# Patient Record
Sex: FEMALE | Race: WHITE | NOT HISPANIC OR LATINO | Employment: FULL TIME
[De-identification: names, ages, dates, MRNs, and addresses within clinical notes are randomized per-mention and may not be internally consistent; named-entity substitution may affect disease eponyms.]

---

## 2017-08-12 ENCOUNTER — HEALTH MAINTENANCE LETTER (OUTPATIENT)
Age: 57
End: 2017-08-12

## 2017-08-16 ENCOUNTER — OFFICE VISIT (OUTPATIENT)
Dept: FAMILY MEDICINE | Facility: OTHER | Age: 57
End: 2017-08-16
Payer: COMMERCIAL

## 2017-08-16 VITALS
BODY MASS INDEX: 22.62 KG/M2 | DIASTOLIC BLOOD PRESSURE: 70 MMHG | OXYGEN SATURATION: 99 % | SYSTOLIC BLOOD PRESSURE: 116 MMHG | WEIGHT: 122.9 LBS | TEMPERATURE: 98.4 F | HEIGHT: 62 IN | HEART RATE: 78 BPM

## 2017-08-16 DIAGNOSIS — H02.9 EYELID LESION: Primary | ICD-10-CM

## 2017-08-16 PROCEDURE — 99213 OFFICE O/P EST LOW 20 MIN: CPT | Performed by: INTERNAL MEDICINE

## 2017-08-16 ASSESSMENT — PAIN SCALES - GENERAL: PAINLEVEL: NO PAIN (0)

## 2017-08-16 NOTE — MR AVS SNAPSHOT
"              After Visit Summary   2017    Sari Pollock    MRN: [id number]           Patient Information     Date Of Birth          1960        Visit Information        Provider Department      2017 2:20 PM Audi Lin DO [facility]        Today's Diagnoses     Eyelid lesion    -  1       Follow-ups after your visit        Who to contact     If you have questions or need follow up information about today's clinic visit or your schedule please contact [facility] directly at [phone].  Normal or non-critical lab and imaging results will be communicated to you by NeuroPacehart, letter or phone within 4 business days after the clinic has received the results. If you do not hear from us within 7 days, please contact the clinic through NeuroPacehart or phone. If you have a critical or abnormal lab result, we will notify you by phone as soon as possible.  Submit refill requests through Rabixo or call your pharmacy and they will forward the refill request to us. Please allow 3 business days for your refill to be completed.          Additional Information About Your Visit        MyChart Information     Rabixo lets you send messages to your doctor, view your test results, renew your prescriptions, schedule appointments and more. To sign up, go to [email or web address] . Click on \"Log in\" on the left side of the screen, which will take you to the Welcome page. Then click on \"Sign up Now\" on the right side of the page.     You will be asked to enter the access code listed below, as well as some personal information. Please follow the directions to create your username and password.     Your access code is: [email or web address]  Expires: 2017  2:40 PM     Your access code will  in 90 days. If you need help or a new code, please call your [facility] or [phone].        Care EveryWhere ID     This is your Care EveryWhere ID. This could be used by other " "organizations to access your [place] medical records  [id number]        Your Vitals Were     Pulse Temperature Height Last Period Pulse Oximetry BMI (Body Mass Index)    78 98.4  F (36.9  C) (Temporal) 5' 2\" (1.575 m) 10/04/2012 99% 22.48 kg/m2       Blood Pressure from Last 3 Encounters:   08/16/17 116/70   11/06/14 118/60   10/17/12 104/70    Weight from Last 3 Encounters:   08/16/17 122 lb 14.4 oz (55.7 kg)   11/06/14 112 lb (50.8 kg)   10/17/12 115 lb 6.4 oz (52.3 kg)              Today, you had the following     No orders found for display       Primary Care Provider Office Phone # Fax #    Dharmesh Zepeda [phone] [address] [place] [address]        Equal Access to Services     [facility]: Hadii aad ku hadasho Soomaali, waaxda luqadaha, qaybta kaalmada adeegyada, waxay vyin hayjaradn kayleen nielson . So [facility] [phone].    ATENCIÓN: Si habla español, tiene a sotelo disposición servicios gratuitos de asistencia lingüística. Devika al [phone].    We comply with applicable federal civil rights laws and [place] laws. We do not discriminate on the basis of race, color, national origin, age, disability sex, sexual orientation or gender identity.            Thank you!     Thank you for choosing [facility]  for your care. Our goal is always to provide you with excellent care. Hearing back from our patients is one way we can continue to improve our services. Please take a few minutes to complete the written survey that you may receive in the mail after your visit with us. Thank you!             Your Updated Medication List - Protect others around you: Learn how to safely use, store and throw away your medicines at www.disposemymeds.org.          This list is accurate as of: 8/16/17  2:40 PM.  Always use your most recent med list.                   Brand Name Dispense Instructions for use Diagnosis    ALLEGRA 180 MG tablet   Generic drug:  fexofenadine "     30 Tab    1 tab PO qDay PRN    Seasonal allergies

## 2017-08-16 NOTE — PROGRESS NOTES
Chief Complaint   Patient presents with     Lesion     on right eye x 8-9 years     The patient is a pleasant 56-year-old female who has a recurrent skin lesion at the very edge of her upper right lid. She does have this removed before but it has recurred. It is starting to interfere with her vision has extends right in the middle of the pupil. I have explained to the patient that there is absolutely no bleeding in this clinic was going to fix this for her. I've recommended the physicians at [facility] and will set up a referral. She would prefer to contact the physicians in [place] as this is closer and see if anybody there will do plastics. I will be happy to send over referral if she chooses.                          DECISION MAKIN. Eyelid lesion  As above        I have carefully explained the diagnosis and treatment options with the patient. The patient has displayed an understanding of the above, and all subsequent questions were answered.         DO KINGSLEY Olmstead    Portions of this note were produced using [employer]  Although every attempt at real-time proof reading has been made, occasional grammar/syntax errors may have been missed.

## 2017-08-16 NOTE — NURSING NOTE
"Chief Complaint   Patient presents with     Lesion     on right eye x 8-9 years       Initial /70 (BP Location: Left arm, Patient Position: Chair, Cuff Size: Adult Regular)  Pulse 78  Temp 98.4  F (36.9  C) (Temporal)  Ht 5' 2\" (1.575 m)  Wt 122 lb 14.4 oz (55.7 kg)  LMP 10/04/2012  SpO2 99%  BMI 22.48 kg/m2 Estimated body mass index is 22.48 kg/(m^2) as calculated from the following:    Height as of this encounter: 5' 2\" (1.575 m).    Weight as of this encounter: 122 lb 14.4 oz (55.7 kg).  Medication Reconciliation: complete  Health Maintenance reviewed at today's visit patient asked to schedule/complete:   Breast Cancer:  Patient declined  Cervical Cancer:  Patient declined  Colon Cancer:  Patient declined   Michelle WATTS      "

## 2017-11-16 ENCOUNTER — TELEPHONE (OUTPATIENT)
Dept: FAMILY MEDICINE | Facility: OTHER | Age: 57
End: 2017-11-16

## 2017-11-16 NOTE — TELEPHONE ENCOUNTER
Panel Management Review      Patient has the following on her problem list: None      Composite cancer screening  Chart review shows that this patient is due/due soon for the following Pap Smear, Mammogram and Colonoscopy  Summary:    Patient is due/failing the following:   COLONOSCOPY, LDL, MAMMOGRAM and PAP    Action needed:   Patient needs office visit for mammogram, pap, colonoscopy.    Type of outreach:    Sent letter.    Questions for provider review:    None                                                                                                                                    Michelle WATTS       Chart routed to Care Team .

## 2017-11-16 NOTE — LETTER
[place] [address] [address] [facility] [id number] [phone]        Sari PLASCENCIA Maris  [address]  [place] [address]      November 16, 2017      Dear Sari,    I care about your health and have reviewed your health plan, including your medical conditions, medication list, and lab results and am making recommendations based on this review, to better manage your health.    You are in particular need of attention regarding:  -Breast Cancer Screening  -Colon Cancer Screening  -Cervical Cancer Screening    I am recommending that you:  -schedule a MAMMOGRAM which is due. You can call  [phone] to schedule your mammogram.    -schedule a PAP SMEAR EXAM. This is a screening test done during a pelvic exam to check for abnormal changes in the cells of the cervix.  Cervical cancer is preventable and curable if abnormal cells are detected and treated early. You can call your clinic at the number listed above to schedule your Pap Smear.    -schedule a COLONOSCOPY.  Colon cancer is now the second leading cause of cancer-related deaths in the [place] for both men and women.  There are over 130,000 new cases and 50,000 deaths per year from colon cancer.  A recent study, which included patients ages 55 to 79 found 50,400 American deaths from colorectal cancer could have been prevented if patients had undergone a colonoscopy in the previous 10 years.    If you have not had a colonoscopy, we encourage you to schedule by contacting us at [phone], Monday - Friday from 7 am - 5:00 pm.  After hours, you may leave a message and we will return your call during normal business hours.      There is another option called a FIT test, if you don t wish to have a colonoscopy, which needs to be repeated every year.  It does replace the colonoscopy for colorectal cancer screening and can detect hidden bleeding in the lower colon.  If a positive result is obtained, you would be referred for a  colonoscopy. Please discuss this option with your provider.      For patients under/uninsured, we recommend you contact the [employer] program. [employer] is a free colorectal cancer screening program that provides colonoscopies for eligible under/uninsured [place] men and women. If you are interested in receiving a free colonoscopy, please call [employer] at [phone] (mention code [employer]Web) to see if you re eligible.     If you've had the preventative screening completed at another facility or feel you're not due for this screening, please call our clinic at the number listed above or send us a My Chart message so we can update our records. We would like to thank you in advance for taking the time to take care of your health.  If you have any questions, please don t hesitate to contact our clinic.    Sincerely,       Your [facility] Team

## 2018-03-08 ENCOUNTER — TELEPHONE (OUTPATIENT)
Dept: FAMILY MEDICINE | Facility: OTHER | Age: 58
End: 2018-03-08

## 2018-03-08 NOTE — LETTER
[place] [address] [address] [facility] [id number] [phone]        Sari PLASCENCIA Maris  [address]  [place] [address]      March 8, 2018      Dear Sari,    I care about your health and have reviewed your health plan, including your medical conditions, medication list, and lab results and am making recommendations based on this review, to better manage your health.    You are in particular need of attention regarding:  -Breast Cancer Screening  -Colon Cancer Screening  -Cervical Cancer Screening    I am recommending that you:  -schedule a MAMMOGRAM which is due. You can call  [phone] to schedule your mammogram.    -schedule a PAP SMEAR EXAM. This is a screening test done during a pelvic exam to check for abnormal changes in the cells of the cervix.  Cervical cancer is preventable and curable if abnormal cells are detected and treated early. You can call your clinic at the number listed above to schedule your Pap Smear.    -schedule a COLONOSCOPY.  Colon cancer is now the second leading cause of cancer-related deaths in the [place] for both men and women.  There are over 130,000 new cases and 50,000 deaths per year from colon cancer.  A recent study, which included patients ages 55 to 79 found 50,400 American deaths from colorectal cancer could have been prevented if patients had undergone a colonoscopy in the previous 10 years.    If you have not had a colonoscopy, we encourage you to schedule by contacting us at [phone], Monday - Friday from 7 am - 5:00 pm.  After hours, you may leave a message and we will return your call during normal business hours.      There is another option called a FIT test, if you don t wish to have a colonoscopy, which needs to be repeated every year.  It does replace the colonoscopy for colorectal cancer screening and can detect hidden bleeding in the lower colon.  If a positive result is obtained, you would be referred for a  colonoscopy. Please discuss this option with your provider.      For patients under/uninsured, we recommend you contact the [employer] program. [employer] is a free colorectal cancer screening program that provides colonoscopies for eligible under/uninsured [place] men and women. If you are interested in receiving a free colonoscopy, please call [employer] at [phone] (mention code [employer]Web) to see if you re eligible.     If you've had the preventative screening completed at another facility or feel you're not due for this screening, please call our clinic at the number listed above or send us a My Chart message so we can update our records. We would like to thank you in advance for taking the time to take care of your health.  If you have any questions, please don t hesitate to contact our clinic.    Sincerely,       Your [facility] Team

## 2018-03-08 NOTE — TELEPHONE ENCOUNTER
Panel Management Review      Patient has the following on her problem list: None      Composite cancer screening  Chart review shows that this patient is due/due soon for the following Pap Smear, Mammogram and Colonoscopy  Summary:    Patient is due/failing the following:   COLONOSCOPY, MAMMOGRAM and PAP    Action needed:   Patient needs referral/order: mammo, colonoscopy    Type of outreach:    Sent letter.    Questions for provider review:    None                                                                                                                                    Michelle WATTS       Chart routed to Care Team .

## 2018-08-16 ENCOUNTER — TELEPHONE (OUTPATIENT)
Dept: FAMILY MEDICINE | Facility: OTHER | Age: 58
End: 2018-08-16

## 2018-08-16 NOTE — LETTER
[place] [address] [address] [facility] [id number] [phone]        Sari PLASCENCIA Maris  [address]  [place] [address]      August 16, 2018      Dear Sari,    I care about your health and have reviewed your health plan, including your medical conditions, medication list, and lab results and am making recommendations based on this review, to better manage your health.    You are in particular need of attention regarding:  -Cholesterol  -Breast Cancer Screening  -Colon Cancer Screening  -Cervical Cancer Screening    I am recommending that you:  -schedule a FOLLOWUP OFFICE APPOINTMENT with me.  I will recheck your: Lipids (fasting cholesterol - nothing to eat except water and/or meds for 8+ hours) test.  -schedule a MAMMOGRAM which is due. You can call  [phone] to schedule your mammogram.    -schedule a PAP SMEAR EXAM. This is a screening test done during a pelvic exam to check for abnormal changes in the cells of the cervix.  Cervical cancer is preventable and curable if abnormal cells are detected and treated early. You can call your clinic at the number listed above to schedule your Pap Smear.    -schedule a COLONOSCOPY.  Colon cancer is now the second leading cause of cancer-related deaths in the [place] for both men and women.  There are over 130,000 new cases and 50,000 deaths per year from colon cancer.  A recent study, which included patients ages 55 to 79 found 50,400 American deaths from colorectal cancer could have been prevented if patients had undergone a colonoscopy in the previous 10 years.    If you have not had a colonoscopy, we encourage you to schedule by contacting us at [phone], Monday through Friday.  After hours, you may leave a message and we will return your call during normal business hours.      There is another option called a FIT test, if you don t wish to have a colonoscopy, which needs to be repeated every year.  It does replace the  colonoscopy for colorectal cancer screening and can detect hidden bleeding in the lower colon.  If a positive result is obtained, you would be referred for a colonoscopy. Please discuss this option with your provider.      For patients under/uninsured, we recommend you contact the "[employer] ([employer])" program. [employer] is a free colorectal cancer screening program that provides colonoscopies for eligible under/uninsured [place] men and women. If you are interested in receiving a free colonoscopy, please call [employer] at [phone] (mention code [employer]opesWeb) to see if you re eligible.     If you've had the preventative screening completed at another facility or feel you're not due for this screening, please call our clinic at the number listed above or send us a My Chart message so we can update our records. We would like to thank you in advance for taking the time to take care of your health.  If you have any questions, please don t hesitate to contact our clinic.    Sincerely,       Your [facility] Team

## 2018-08-16 NOTE — TELEPHONE ENCOUNTER
Panel Management Review      Patient has the following on her problem list: None      Composite cancer screening  Chart review shows that this patient is due/due soon for the following Pap Smear, Mammogram and Colonoscopy  Summary:    Patient is due/failing the following:   COLONOSCOPY, LDL, MAMMOGRAM and PAP    Action needed:   Patient needs office visit .    Type of outreach:    Sent letter.    Questions for provider review:    None                                                                                                                                    Michelle WATTS       Chart routed to Care Team .

## 2018-08-21 ENCOUNTER — TELEPHONE (OUTPATIENT)
Dept: FAMILY MEDICINE | Facility: CLINIC | Age: 58
End: 2018-08-21

## 2018-08-21 NOTE — TELEPHONE ENCOUNTER
8/21/2018    Attempt 1    Contacted patient in regards to scheduling VIP mammogram  Message on voicemail     Patient is also due for - Preventive Health Screening Colonoscopy, Cervical/PAP and LDL    Comments:       Outreach   CC

## 2018-09-06 NOTE — TELEPHONE ENCOUNTER
9/6/2018    Attempt 2    Contacted patient in regards to scheduling VIP mammogram  Message     Patient is also due for - Preventive Health Screening Colonoscopy, Cervical/PAP and LDL    Comments: no       Outreach   Ave MENDOZA

## 2018-09-12 NOTE — TELEPHONE ENCOUNTER
9/12/2018    Attempt 3    Contacted patient in regards to scheduling VIP mammogram  Message on voicemail     Patient is also due for - Preventive Health Screening Colonoscopy and Cervical/PAP    Comments:       Outreach   Cherise Shaw

## 2023-09-18 ENCOUNTER — NURSE TRIAGE (OUTPATIENT)
Dept: FAMILY MEDICINE | Facility: CLINIC | Age: 63
End: 2023-09-18
Payer: COMMERCIAL

## 2023-09-18 NOTE — TELEPHONE ENCOUNTER
Patient returning call. Appointment has been scheduled with Dr. Zepeda on Friday, 9/22, with a 1:40 pm arrival. Meg Soto LPN

## 2023-09-18 NOTE — TELEPHONE ENCOUNTER
"Patient has had right sided abdominal pain for 3 weeks.    She has a dull ache all the time.  She states at least twice a day she has a sharp pain like she is being stabbed.  It is worse if she is walking or bending, but even laughing can bring it on.    Her pain right now is a 7 out of 10.  She states her bowels have been getting smaller and harder the last few days. She has used a stool softer and this helped. This does not make the pain better.    She states there is a small lump above the area of pain that is soft and \"squishy\"    Per protocol patient advised to be seen today. There are no openings in clinic today. She doesn't want to go to the ER.    Please advise if she can be seen in clinic?    Michelle Martínez RN on 9/18/2023 at 2:20 PM    Reason for Disposition   MILD TO MODERATE constant pain lasting > 2 hours    Additional Information   Negative: Passed out (i.e., fainted, collapsed and was not responding)   Negative: Shock suspected (e.g., cold/pale/clammy skin, too weak to stand, low BP, rapid pulse)   Negative: Sounds like a life-threatening emergency to the triager   Negative: Followed an abdomen (stomach) injury   Negative: Chest pain   Negative: Abdominal pain and pregnant < 20 weeks   Negative: Abdominal pain and pregnant 20 or more weeks   Negative: Pain is mainly in upper abdomen (if needed ask: 'is it mainly above the belly button?')   Negative: Abdomen bloating or swelling are main symptoms   Negative: SEVERE abdominal pain (e.g., excruciating)   Negative: Vomiting red blood or black (coffee ground) material   Negative: Blood in bowel movements  (Exception: Blood on surface of BM with constipation.)   Negative: Black or tarry bowel movements  (Exception: Chronic-unchanged black-grey BMs AND is taking iron pills or Pepto-Bismol.)    Answer Assessment - Initial Assessment Questions  1. LOCATION: \"Where does it hurt?\"       Right lower abdomen- to the right of her belly button.  2. RADIATION: \"Does " "the pain shoot anywhere else?\" (e.g., chest, back)      none  3. ONSET: \"When did the pain begin?\" (e.g., minutes, hours or days ago)       3 weeks ago  4. SUDDEN: \"Gradual or sudden onset?\"      gradual  5. PATTERN \"Does the pain come and go, or is it constant?\"     - If it comes and goes: \"How long does it last?\" \"Do you have pain now?\"      (Note: Comes and goes means the pain is intermittent. It goes away completely between bouts.)     - If constant: \"Is it getting better, staying the same, or getting worse?\"       (Note: Constant means the pain never goes away completely; most serious pain is constant and gets worse.)       Constant, and gets worse when bending  6. SEVERITY: \"How bad is the pain?\"  (e.g., Scale 1-10; mild, moderate, or severe)     - MILD (1-3): Doesn't interfere with normal activities, abdomen soft and not tender to touch.      - MODERATE (4-7): Interferes with normal activities or awakens from sleep, abdomen tender to touch.      - SEVERE (8-10): Excruciating pain, doubled over, unable to do any normal activities.        7 out of 10- getting worse today  7. RECURRENT SYMPTOM: \"Have you ever had this type of stomach pain before?\" If Yes, ask: \"When was the last time?\" and \"What happened that time?\"       She had interception years ago  8. CAUSE: \"What do you think is causing the stomach pain?\"      Not sure  9. RELIEVING/AGGRAVATING FACTORS: \"What makes it better or worse?\" (e.g., antacids, bending or twisting motion, bowel movement)      No relief from bowels  10. OTHER SYMPTOMS: \"Do you have any other symptoms?\" (e.g., back pain, diarrhea, fever, urination pain, vomiting)        none  11. PREGNANCY: \"Is there any chance you are pregnant?\" \"When was your last menstrual period?\"        no    Protocols used: Abdominal Pain - Female-A-[place]    "

## 2023-09-18 NOTE — TELEPHONE ENCOUNTER
Message handled by Nurse Triage with [employer] - provider name: Duane .    Patient can be seen in clinic this week.   There are openings on Friday.    Left message at home number to call back.  Michelle Martínez RN on 9/18/2023 at 2:54 PM

## 2023-09-22 ENCOUNTER — TELEPHONE (OUTPATIENT)
Dept: FAMILY MEDICINE | Facility: CLINIC | Age: 63
End: 2023-09-22

## 2023-09-22 ENCOUNTER — OFFICE VISIT (OUTPATIENT)
Dept: FAMILY MEDICINE | Facility: CLINIC | Age: 63
End: 2023-09-22
Payer: COMMERCIAL

## 2023-09-22 VITALS
OXYGEN SATURATION: 99 % | WEIGHT: 136 LBS | HEART RATE: 66 BPM | SYSTOLIC BLOOD PRESSURE: 122 MMHG | BODY MASS INDEX: 25.03 KG/M2 | TEMPERATURE: 98 F | RESPIRATION RATE: 18 BRPM | DIASTOLIC BLOOD PRESSURE: 76 MMHG | HEIGHT: 62 IN

## 2023-09-22 DIAGNOSIS — Z13.220 LIPID SCREENING: ICD-10-CM

## 2023-09-22 DIAGNOSIS — R10.11 RUQ ABDOMINAL PAIN: Primary | ICD-10-CM

## 2023-09-22 LAB
ALBUMIN SERPL BCG-MCNC: 4.7 G/DL (ref 3.5–5.2)
ALP SERPL-CCNC: 64 U/L (ref 35–104)
ALT SERPL W P-5'-P-CCNC: 21 U/L (ref 0–50)
ANION GAP SERPL CALCULATED.3IONS-SCNC: 14 MMOL/L (ref 7–15)
AST SERPL W P-5'-P-CCNC: 21 U/L (ref 0–45)
BILIRUB SERPL-MCNC: 0.5 MG/DL
BUN SERPL-MCNC: 14.3 MG/DL (ref 8–23)
CALCIUM SERPL-MCNC: 9.9 MG/DL (ref 8.8–10.2)
CHLORIDE SERPL-SCNC: 104 MMOL/L (ref 98–107)
CHOLEST SERPL-MCNC: 206 MG/DL
CREAT SERPL-MCNC: 0.74 MG/DL (ref 0.51–0.95)
DEPRECATED HCO3 PLAS-SCNC: 21 MMOL/L (ref 22–29)
EGFRCR SERPLBLD CKD-EPI 2021: >90 ML/MIN/1.73M2
ERYTHROCYTE [DISTWIDTH] IN BLOOD BY AUTOMATED COUNT: 12.6 % (ref 10–15)
GLUCOSE SERPL-MCNC: 100 MG/DL (ref 70–99)
HCT VFR BLD AUTO: 38.8 % (ref 35–47)
HDLC SERPL-MCNC: 66 MG/DL
HGB BLD-MCNC: 13 G/DL (ref 11.7–15.7)
LDLC SERPL CALC-MCNC: 122 MG/DL
LIPASE SERPL-CCNC: 19 U/L (ref 13–60)
MCH RBC QN AUTO: 31.9 PG (ref 26.5–33)
MCHC RBC AUTO-ENTMCNC: 33.5 G/DL (ref 31.5–36.5)
MCV RBC AUTO: 95 FL (ref 78–100)
NONHDLC SERPL-MCNC: 140 MG/DL
PLATELET # BLD AUTO: 347 10E3/UL (ref 150–450)
POTASSIUM SERPL-SCNC: 4.3 MMOL/L (ref 3.4–5.3)
PROT SERPL-MCNC: 7.5 G/DL (ref 6.4–8.3)
RBC # BLD AUTO: 4.08 10E6/UL (ref 3.8–5.2)
SODIUM SERPL-SCNC: 139 MMOL/L (ref 136–145)
TRIGL SERPL-MCNC: 88 MG/DL
WBC # BLD AUTO: 6.5 10E3/UL (ref 4–11)

## 2023-09-22 PROCEDURE — 99204 OFFICE O/P NEW MOD 45 MIN: CPT | Performed by: FAMILY MEDICINE

## 2023-09-22 PROCEDURE — 85027 COMPLETE CBC AUTOMATED: CPT | Performed by: FAMILY MEDICINE

## 2023-09-22 PROCEDURE — 80053 COMPREHEN METABOLIC PANEL: CPT | Performed by: FAMILY MEDICINE

## 2023-09-22 PROCEDURE — 80061 LIPID PANEL: CPT | Performed by: FAMILY MEDICINE

## 2023-09-22 PROCEDURE — 83690 ASSAY OF LIPASE: CPT | Performed by: FAMILY MEDICINE

## 2023-09-22 PROCEDURE — 36415 COLL VENOUS BLD VENIPUNCTURE: CPT | Performed by: FAMILY MEDICINE

## 2023-09-22 ASSESSMENT — PAIN SCALES - GENERAL: PAINLEVEL: SEVERE PAIN (6)

## 2023-09-22 NOTE — PROGRESS NOTES
Assessment & Plan     ASSESSMENT/ORDERS:    ICD-10-CM    1. RUQ abdominal pain  R10.11 Comprehensive metabolic panel (BMP + Alb, Alk Phos, ALT, AST, Total. Bili, TP)     CBC with platelets     Lipase     US Abdomen Limited      2. Lipid screening  Z13.220 Lipid panel reflex to direct LDL Fasting        PLAN:  1.  Labs and ultrasound to identify cause of right upper quadrant pain.  Will contact patient after results.                  Dharmesh Zepeda MD  [facility]    Mitchell Guo is a 62 year old, presenting for the following health issues:  Abdominal Pain      9/22/2023     1:43 PM   Additional Questions   Roomed by Amanda MILLER       History of Present Illness       Reason for visit:  Right side stabbing pains  Symptom onset:  3-4 weeks ago  Symptoms include:  Right side stabbing pains  Symptom intensity:  Severe  Symptom progression:  Staying the same  Had these symptoms before:  No  What makes it worse:  Bending,reaching,  What makes it better:  Rest    She eats 2-3 servings of fruits and vegetables daily.She consumes 1 sweetened beverage(s) daily.She exercises with enough effort to increase her heart rate 30 to 60 minutes per day.  She exercises with enough effort to increase her heart rate 5 days per week.   She is taking medications regularly.             Pain to the right of the belly button and slightly lower.  Worse with bending, laughing, talking, strethching.  Working makes it worse, as a  at a [employer], 60/week.      Not related to food.  No blood in stool or melena.  No diarrhea or constipation.      Did have some dizziness 4 days ago with some more severe symptoms.  No fevers, chills, nausea, or vomiting.  Normal appetite.  Minimal weight loss over the last week.      No epigastric or left sided pain.  History of left colon intussception with surgery, but self-resolved, 16 years ago.  No other abdominal surgeries, no pregnancies. Post menopausal x7  "years.  No vaginal discharge or urinary symptoms.     Review of Systems         Objective    /76   Pulse 66   Temp 98  F (36.7  C) (Tympanic)   Resp 18   Ht 1.575 m (5' 2\")   Wt 61.7 kg (136 lb)   LMP 10/04/2012   SpO2 99%   BMI 24.87 kg/m    Body mass index is 24.87 kg/m .  Physical Exam  Constitutional:       Appearance: Normal appearance. She is well-developed.   Cardiovascular:      Rate and Rhythm: Normal rate and regular rhythm.      Heart sounds: Normal heart sounds, S1 normal and S2 normal. No murmur heard.  Pulmonary:      Effort: Pulmonary effort is normal. No respiratory distress.      Breath sounds: Normal breath sounds. No wheezing, rhonchi or rales.   Abdominal:      General: Abdomen is flat. Bowel sounds are normal. There is no distension.      Palpations: Abdomen is soft.      Tenderness: There is generalized abdominal tenderness and tenderness in the right upper quadrant. There is no guarding or rebound. Positive signs include Perez's sign. Negative signs include McBurney's sign.      Hernia: There is no hernia in the umbilical area.   Neurological:      Mental Status: She is alert.                            "

## 2023-09-22 NOTE — RESULT ENCOUNTER NOTE
"Will have staff call patient with message regarding recent results:  \"Test results indicate cholesterol is mildly elevated and all other labs normal.  Will await ultrasound.\"    Dharmesh Zepeda MD "

## 2023-09-22 NOTE — TELEPHONE ENCOUNTER
"----- Message from Dharmesh Zepeda MD sent at 9/22/2023  4:34 PM CDT -----  Will have staff call patient with message regarding recent results:  \"Test results indicate cholesterol is mildly elevated and all other labs normal.  Will await ultrasound.\"    Dharmesh Zepeda MD   "

## 2023-09-26 ENCOUNTER — HOSPITAL ENCOUNTER (OUTPATIENT)
Dept: ULTRASOUND IMAGING | Facility: CLINIC | Age: 63
Discharge: HOME | End: 2023-09-26
Attending: FAMILY MEDICINE | Admitting: FAMILY MEDICINE
Payer: COMMERCIAL

## 2023-09-26 DIAGNOSIS — R10.11 RUQ ABDOMINAL PAIN: ICD-10-CM

## 2023-09-26 PROCEDURE — 76705 ECHO EXAM OF ABDOMEN: CPT

## 2023-10-06 ENCOUNTER — TELEPHONE (OUTPATIENT)
Dept: FAMILY MEDICINE | Facility: CLINIC | Age: 63
End: 2023-10-06
Payer: COMMERCIAL

## 2023-10-06 DIAGNOSIS — R10.84 ABDOMINAL PAIN, GENERALIZED: ICD-10-CM

## 2023-10-06 DIAGNOSIS — R10.11 RUQ ABDOMINAL PAIN: Primary | ICD-10-CM

## 2023-10-06 NOTE — TELEPHONE ENCOUNTER
Patient was seen 9/22/23 for RUQ pain.    She states the pain is not any better and she would like to know what the next steps are?    She states the pain still comes and goes- stays about 4-5 when she is resting. She had coffee the other day and her pain got severe to the point she had to sit for 15 minutes.    Her ultrasound was 9/26/23.    Please advise.    Michelle Martínez RN on 10/6/2023 at 1:35 PM

## 2023-10-06 NOTE — RESULT ENCOUNTER NOTE
See recent separate message in chart regarding results management and patient communication of these results.    Dharmesh Zepeda MD

## 2023-10-06 NOTE — TELEPHONE ENCOUNTER
Recommend CT of abdomen/pelvis to look more indepth for possible abdominal pain causes.  Will have staff notify patient and help schedule.    Dharmesh Zepeda MD

## 2023-10-09 NOTE — TELEPHONE ENCOUNTER
Pt notified. MA offered to schedule but she is driving and will call clinic back to schedule.      Amanda Villa MA

## 2023-10-10 ENCOUNTER — HOSPITAL ENCOUNTER (OUTPATIENT)
Dept: CT IMAGING | Facility: CLINIC | Age: 63
Discharge: HOME | End: 2023-10-10
Attending: FAMILY MEDICINE | Admitting: FAMILY MEDICINE
Payer: COMMERCIAL

## 2023-10-10 DIAGNOSIS — R10.11 RUQ ABDOMINAL PAIN: ICD-10-CM

## 2023-10-10 DIAGNOSIS — R10.84 ABDOMINAL PAIN, GENERALIZED: ICD-10-CM

## 2023-10-10 PROCEDURE — 250N000009 HC RX 250: Performed by: FAMILY MEDICINE

## 2023-10-10 PROCEDURE — 250N000011 HC RX IP 250 OP 636: Performed by: FAMILY MEDICINE

## 2023-10-10 PROCEDURE — 74177 CT ABD & PELVIS W/CONTRAST: CPT

## 2023-10-10 RX ORDER — IOPAMIDOL 755 MG/ML
500 INJECTION, SOLUTION INTRAVASCULAR ONCE
Status: COMPLETED | OUTPATIENT
Start: 2023-10-10 | End: 2023-10-10

## 2023-10-10 RX ADMIN — IOPAMIDOL 70 ML: 755 INJECTION, SOLUTION INTRAVENOUS at 13:38

## 2023-10-10 RX ADMIN — SODIUM CHLORIDE 60 ML: 9 INJECTION, SOLUTION INTRAVENOUS at 13:38

## 2023-10-11 ENCOUNTER — TELEPHONE (OUTPATIENT)
Dept: FAMILY MEDICINE | Facility: CLINIC | Age: 63
End: 2023-10-11
Payer: COMMERCIAL

## 2023-10-11 DIAGNOSIS — R10.11 RUQ ABDOMINAL PAIN: Primary | ICD-10-CM

## 2023-10-11 DIAGNOSIS — R10.84 ABDOMINAL PAIN, GENERALIZED: ICD-10-CM

## 2023-10-11 NOTE — TELEPHONE ENCOUNTER
Patient is calling and stated she did not hear back from the provider on the results of her CT scan that was completed yesterday.  She stated she is in intermittent moderate/ severe pain with certain body movements and she needs to go to work.      Advised patient that provider will send her a message regarding her results, that both the patient and provider get the results at the same time and the provider will typically need a day or so to review results and send a message back to patient.  Advised patient to seek urgent/ emergency care for worsening symptoms.  Patient stated understanding.      Patient is requesting a work note from PCP for missing work due to the pain.  She stated she is unable to take OTC pain medication due to her stomach gets upset, and is asking if there is anything she can take for the pain.    Advised patient of Home care remedies per protocol, Josue Dowling guidelines for [facility] triage, Advised to seek urgent/ emergency care for worsening symptoms.  Patient stated understanding.    Will forward to PCP for review.    Saba Winston RN

## 2023-10-11 NOTE — LETTER
October 12, 2023    To Whom it may Concern:    Sari Pollock is a patient of mine and currently is unable to work due to severe abdominal pain and is undergoing evaluation for this.  She will require medical clearance prior to her return to work.    If you have any further questions, please contact me at the number above.    Sincerely,        Dharmesh Zepeda MD

## 2023-10-12 RX ORDER — PANTOPRAZOLE SODIUM 40 MG/1
40 TABLET, DELAYED RELEASE ORAL DAILY
Qty: 30 TABLET | Refills: 1 | Status: SHIPPED | OUTPATIENT
Start: 2023-10-12 | End: 2023-11-07 | Stop reason: SINTOL

## 2023-10-12 NOTE — TELEPHONE ENCOUNTER
Will have her trial pantoprazole and I ordered a EGD to be completed to see if there is a stomach issue that can only be seen with a scope.    Letter sent via [employer] regarding being unable to work.    Will have staff notify patient.    Dharmesh Zepeda MD

## 2023-10-16 ENCOUNTER — TELEPHONE (OUTPATIENT)
Dept: GASTROENTEROLOGY | Facility: CLINIC | Age: 63
End: 2023-10-16
Payer: COMMERCIAL

## 2023-10-16 NOTE — TELEPHONE ENCOUNTER
"Endoscopy Scheduling Screen    Have you had a positive Covid test in the last 14 days?  No    Are you active on MyChart?   Yes    What insurance is in the chart?  Other:  [employer]    Ordering/Referring Provider:     JERICHO GONZALES      (If ordering provider performs procedure, schedule with ordering provider unless otherwise instructed. )    BMI: Estimated body mass index is 24.87 kg/m  as calculated from the following:    Height as of 9/22/23: 1.575 m (5' 2\").    Weight as of 9/22/23: 61.7 kg (136 lb).     Sedation Ordered  moderate sedation.   If patient BMI > 50 do not schedule in [facility].    If patient BMI > 45 do not schedule at [facility].    Are you taking methadone or Suboxone?  No    Are you taking any prescription medications for pain 3 or more times per week?   No    Do you have a history of malignant hyperthermia or adverse reaction to anesthesia?  Yes (Continue with scheduling. Nurse will notify anesthesia at scheduled location for eval.)    (Females) Are you currently pregnant?        Have you been diagnosed or told you have pulmonary hypertension?   No    Do you have an LVAD?  No    Have you been told you have moderate to severe sleep apnea?  No    Have you been told you have COPD, asthma, or any other lung disease?  No    Do you have any heart conditions?  No     Have you ever had an organ transplant?   No    Have you ever had or are you awaiting a heart or lung transplant?   No    Have you had a stroke or transient ischemic attack (TIA aka \"mini stroke\" in the last 6 months?   No    Have you been diagnosed with or been told you have cirrhosis of the liver?   No    Are you currently on dialysis?   No    Do you need assistance transferring?   No    BMI: Estimated body mass index is 24.87 kg/m  as calculated from the following:    Height as of 9/22/23: 1.575 m (5' 2\").    Weight as of 9/22/23: 61.7 kg (136 lb).     Is patients BMI > 40 and scheduling location [facility]?  No    Do you take an injectable medication " for weight loss or diabetes (excluding insulin)?  No    Do you take the medication Naltrexone?  No    Do you take blood thinners?  No       Prep   Are you currently on dialysis or do you have chronic kidney disease?  No    Do you have a diagnosis of diabetes?  No    Do you have a diagnosis of cystic fibrosis (CF)?  No    On a regular basis do you go 3 -5 days between bowel movements?  No    BMI > 40?  No    Preferred Pharmacy:      [facility] [address] [place], [place] - [address]  [address]  [facility] [address]  Phone: [phone] Fax: [phone]    Final Scheduling Details   Colonoscopy prep sent?      Procedure scheduled  Colonoscopy    Surgeon:  THOMPSON     Date of procedure:  10/26     Pre-OP / PAC:   No - Not required for this site.    Location  [facility] - Per order.    Sedation   MAC/Deep Sedation  [facility]      Patient Reminders:   You will receive a call from a Nurse to review instructions and health history.  This assessment must be completed prior to your procedure.  Failure to complete the Nurse assessment may result in the procedure being cancelled.      On the day of your procedure, please designate an adult(s) who can drive you home stay with you for the next 24 hours. The medicines used in the exam will make you sleepy. You will not be able to drive.      You cannot take public transportation, ride share services, or non-medical taxi service without a responsible caregiver.  Medical transport services are allowed with the requirement that a responsible caregiver will receive you at your destination.  We require that drivers and caregivers are confirmed prior to your procedure.

## 2023-10-18 ENCOUNTER — TELEPHONE (OUTPATIENT)
Dept: FAMILY MEDICINE | Facility: CLINIC | Age: 63
End: 2023-10-18
Payer: COMMERCIAL

## 2023-10-19 NOTE — TELEPHONE ENCOUNTER
She does not.  She should get further instructions from the same day procedure team closer to her procedure date.  Will have staff notify patient.     Dharmesh Zepeda MD

## 2023-10-19 NOTE — TELEPHONE ENCOUNTER
If pain is returning please have her continue same dose of Protonix. The scope will be able to see her entire stomach/duodenum to help determine the source of her pain.     Amanda Lyle PA-C  Covering for Dharmesh Zepeda

## 2023-10-19 NOTE — TELEPHONE ENCOUNTER
Patient notified of providers message below. Patient stating she felt her indigestion/abdominal pain had worsened and had stopped taking the Protonix already. Patient stating she has been eating bland, drinking milk and taking tums which seem to help.    Patient is questioning how far they scope down into her stomach?  Wants message sent to her mychart if able to answer. Meg Soto LPN

## 2023-10-22 ENCOUNTER — TELEPHONE (OUTPATIENT)
Dept: FAMILY MEDICINE | Facility: CLINIC | Age: 63
End: 2023-10-22
Payer: COMMERCIAL

## 2023-10-22 NOTE — TELEPHONE ENCOUNTER
Will have staff notify patient that she should have both EGD and colonoscopy done for her abdominal pain evaluation.  They should both already be scheduled on day of procedure.    Dharmesh Zepeda MD

## 2023-10-22 NOTE — TELEPHONE ENCOUNTER
----- Message from Carey Bridges RN sent at 10/19/2023  6:59 PM CDT -----  Regarding: EGD on 10/26  Patient is questioning if upper endoscopy is the appropriate procedure for the pain she is having. States she is having pain in the RLQ and is questioning weather a colonoscopy would be more appropriate? RN stated she would double check with provider. Thank you!    Carey Bridges RN

## 2023-10-25 ENCOUNTER — ANESTHESIA EVENT (OUTPATIENT)
Dept: GASTROENTEROLOGY | Facility: CLINIC | Age: 63
End: 2023-10-25
Payer: COMMERCIAL

## 2023-10-25 ASSESSMENT — LIFESTYLE VARIABLES: TOBACCO_USE: 1

## 2023-10-26 ENCOUNTER — HOSPITAL ENCOUNTER (OUTPATIENT)
Facility: CLINIC | Age: 63
Discharge: HOME | End: 2023-10-26
Attending: SURGERY | Admitting: SURGERY
Payer: COMMERCIAL

## 2023-10-26 ENCOUNTER — ANESTHESIA (OUTPATIENT)
Dept: GASTROENTEROLOGY | Facility: CLINIC | Age: 63
End: 2023-10-26
Payer: COMMERCIAL

## 2023-10-26 VITALS
OXYGEN SATURATION: 99 % | TEMPERATURE: 97.6 F | DIASTOLIC BLOOD PRESSURE: 76 MMHG | RESPIRATION RATE: 16 BRPM | HEART RATE: 71 BPM | SYSTOLIC BLOOD PRESSURE: 145 MMHG

## 2023-10-26 LAB
COLONOSCOPY: NORMAL
UPPER GI ENDOSCOPY: NORMAL

## 2023-10-26 PROCEDURE — 258N000003 HC RX IP 258 OP 636: Performed by: NURSE ANESTHETIST, CERTIFIED REGISTERED

## 2023-10-26 PROCEDURE — 250N000011 HC RX IP 250 OP 636: Mod: JZ | Performed by: SURGERY

## 2023-10-26 PROCEDURE — 43239 EGD BIOPSY SINGLE/MULTIPLE: CPT | Performed by: SURGERY

## 2023-10-26 PROCEDURE — 250N000013 HC RX MED GY IP 250 OP 250 PS 637: Performed by: NURSE ANESTHETIST, CERTIFIED REGISTERED

## 2023-10-26 PROCEDURE — 88305 TISSUE EXAM BY PATHOLOGIST: CPT | Mod: 26 | Performed by: PATHOLOGY

## 2023-10-26 PROCEDURE — 45378 DIAGNOSTIC COLONOSCOPY: CPT | Performed by: SURGERY

## 2023-10-26 PROCEDURE — 250N000011 HC RX IP 250 OP 636: Mod: JZ | Performed by: NURSE ANESTHETIST, CERTIFIED REGISTERED

## 2023-10-26 PROCEDURE — 370N000017 HC ANESTHESIA TECHNICAL FEE, PER MIN: Performed by: SURGERY

## 2023-10-26 PROCEDURE — 250N000009 HC RX 250: Performed by: NURSE ANESTHETIST, CERTIFIED REGISTERED

## 2023-10-26 PROCEDURE — 88342 IMHCHEM/IMCYTCHM 1ST ANTB: CPT | Mod: 26 | Performed by: PATHOLOGY

## 2023-10-26 PROCEDURE — 88312 SPECIAL STAINS GROUP 1: CPT | Mod: 26 | Performed by: PATHOLOGY

## 2023-10-26 PROCEDURE — 88305 TISSUE EXAM BY PATHOLOGIST: CPT | Mod: TC | Performed by: SURGERY

## 2023-10-26 PROCEDURE — 250N000009 HC RX 250: Performed by: SURGERY

## 2023-10-26 RX ORDER — NALOXONE HYDROCHLORIDE 0.4 MG/ML
0.2 INJECTION, SOLUTION INTRAMUSCULAR; INTRAVENOUS; SUBCUTANEOUS
Status: DISCONTINUED | OUTPATIENT
Start: 2023-10-26 | End: 2023-10-26 | Stop reason: HOSPADM

## 2023-10-26 RX ORDER — ONDANSETRON 2 MG/ML
4 INJECTION INTRAMUSCULAR; INTRAVENOUS EVERY 6 HOURS PRN
Status: DISCONTINUED | OUTPATIENT
Start: 2023-10-26 | End: 2023-10-26 | Stop reason: HOSPADM

## 2023-10-26 RX ORDER — FLUMAZENIL 0.1 MG/ML
0.2 INJECTION, SOLUTION INTRAVENOUS
Status: DISCONTINUED | OUTPATIENT
Start: 2023-10-26 | End: 2023-10-26 | Stop reason: HOSPADM

## 2023-10-26 RX ORDER — GLYCOPYRROLATE 0.2 MG/ML
INJECTION, SOLUTION INTRAMUSCULAR; INTRAVENOUS PRN
Status: DISCONTINUED | OUTPATIENT
Start: 2023-10-26 | End: 2023-10-26

## 2023-10-26 RX ORDER — PROPOFOL 10 MG/ML
INJECTION, EMULSION INTRAVENOUS PRN
Status: DISCONTINUED | OUTPATIENT
Start: 2023-10-26 | End: 2023-10-26

## 2023-10-26 RX ORDER — LIDOCAINE HYDROCHLORIDE 20 MG/ML
INJECTION, SOLUTION INFILTRATION; PERINEURAL PRN
Status: DISCONTINUED | OUTPATIENT
Start: 2023-10-26 | End: 2023-10-26

## 2023-10-26 RX ORDER — PROCHLORPERAZINE MALEATE 5 MG
10 TABLET ORAL EVERY 6 HOURS PRN
Status: DISCONTINUED | OUTPATIENT
Start: 2023-10-26 | End: 2023-10-26 | Stop reason: HOSPADM

## 2023-10-26 RX ORDER — SODIUM CHLORIDE, SODIUM LACTATE, POTASSIUM CHLORIDE, CALCIUM CHLORIDE 600; 310; 30; 20 MG/100ML; MG/100ML; MG/100ML; MG/100ML
INJECTION, SOLUTION INTRAVENOUS CONTINUOUS
Status: DISCONTINUED | OUTPATIENT
Start: 2023-10-26 | End: 2023-10-26 | Stop reason: HOSPADM

## 2023-10-26 RX ORDER — NALOXONE HYDROCHLORIDE 0.4 MG/ML
0.4 INJECTION, SOLUTION INTRAMUSCULAR; INTRAVENOUS; SUBCUTANEOUS
Status: DISCONTINUED | OUTPATIENT
Start: 2023-10-26 | End: 2023-10-26 | Stop reason: HOSPADM

## 2023-10-26 RX ORDER — ONDANSETRON 2 MG/ML
4 INJECTION INTRAMUSCULAR; INTRAVENOUS
Status: COMPLETED | OUTPATIENT
Start: 2023-10-26 | End: 2023-10-26

## 2023-10-26 RX ORDER — PROPOFOL 10 MG/ML
INJECTION, EMULSION INTRAVENOUS CONTINUOUS PRN
Status: DISCONTINUED | OUTPATIENT
Start: 2023-10-26 | End: 2023-10-26

## 2023-10-26 RX ORDER — ONDANSETRON 4 MG/1
4 TABLET, ORALLY DISINTEGRATING ORAL EVERY 6 HOURS PRN
Status: DISCONTINUED | OUTPATIENT
Start: 2023-10-26 | End: 2023-10-26 | Stop reason: HOSPADM

## 2023-10-26 RX ORDER — LIDOCAINE 40 MG/G
CREAM TOPICAL
Status: DISCONTINUED | OUTPATIENT
Start: 2023-10-26 | End: 2023-10-26 | Stop reason: HOSPADM

## 2023-10-26 RX ORDER — CITRIC ACID/SODIUM CITRATE 334-500MG
SOLUTION, ORAL ORAL PRN
Status: DISCONTINUED | OUTPATIENT
Start: 2023-10-26 | End: 2023-10-26

## 2023-10-26 RX ORDER — ACETAMINOPHEN 500 MG
500-1000 TABLET ORAL EVERY 6 HOURS PRN
COMMUNITY

## 2023-10-26 RX ADMIN — LIDOCAINE HYDROCHLORIDE 0.5 ML: 10 INJECTION, SOLUTION EPIDURAL; INFILTRATION; INTRACAUDAL; PERINEURAL at 09:34

## 2023-10-26 RX ADMIN — PROPOFOL 150 MCG/KG/MIN: 10 INJECTION, EMULSION INTRAVENOUS at 09:59

## 2023-10-26 RX ADMIN — PROPOFOL 30 MG: 10 INJECTION, EMULSION INTRAVENOUS at 09:51

## 2023-10-26 RX ADMIN — LIDOCAINE HYDROCHLORIDE 70 MG: 20 INJECTION, SOLUTION INFILTRATION; PERINEURAL at 09:49

## 2023-10-26 RX ADMIN — PROPOFOL 100 MG: 10 INJECTION, EMULSION INTRAVENOUS at 09:49

## 2023-10-26 RX ADMIN — FAMOTIDINE 20 MG: 10 INJECTION, SOLUTION INTRAVENOUS at 09:21

## 2023-10-26 RX ADMIN — PROPOFOL 30 MG: 10 INJECTION, EMULSION INTRAVENOUS at 09:53

## 2023-10-26 RX ADMIN — GLYCOPYRROLATE 0.2 MG: 0.2 INJECTION, SOLUTION INTRAMUSCULAR; INTRAVENOUS at 10:10

## 2023-10-26 RX ADMIN — ONDANSETRON 4 MG: 2 INJECTION INTRAMUSCULAR; INTRAVENOUS at 09:35

## 2023-10-26 RX ADMIN — SODIUM CHLORIDE, POTASSIUM CHLORIDE, SODIUM LACTATE AND CALCIUM CHLORIDE: 600; 310; 30; 20 INJECTION, SOLUTION INTRAVENOUS at 09:33

## 2023-10-26 RX ADMIN — SODIUM CITRATE AND CITRIC ACID MONOHYDRATE 30 ML: 500; 334 SOLUTION ORAL at 09:43

## 2023-10-26 RX ADMIN — GLYCOPYRROLATE 0.2 MG: 0.2 INJECTION, SOLUTION INTRAMUSCULAR; INTRAVENOUS at 10:08

## 2023-10-26 ASSESSMENT — ACTIVITIES OF DAILY LIVING (ADL)
ADLS_ACUITY_SCORE: 35
ADLS_ACUITY_SCORE: 33

## 2023-10-26 NOTE — H&P
Patient seen for Endoscopy    HPI:  Patient is a 62 year old female w abdominal pain here for endoscopy. Not taking blood thinning medications. No MI or CVA history. No issues with previous sedation. No recent acute illness.    Review Of Systems    Skin: negative  Ears/Nose/Throat: negative  Respiratory: No shortness of breath, dyspnea on exertion, cough, or hemoptysis  Cardiovascular: negative  Gastrointestinal: negative  Genitourinary: negative  Musculoskeletal: negative  Neurologic: negative  Hematologic/Lymphatic/Immunologic: negative  Endocrine: negative      Past Medical History:   Diagnosis Date    Anxiety     Ulcers        Past Surgical History:   Procedure Laterality Date    COLON SURGERY  2007    intusseception    [facility] ORAL SURGERY PROCEDURE      2 teeth extracted       Family History   Problem Relation Age of Onset    Hypertension Mother     Alcohol/Drug Mother     Alcohol/Drug Father     Depression Mother     Gastrointestinal Disease Mother     Eye Disorder Father         glaucoma    Gynecology Mother         hyst age 34    Obesity Mother        Social History     Socioeconomic History    Marital status: Single     Spouse name: Not on file    Number of children: Not on file    Years of education: Not on file    Highest education level: Not on file   Occupational History    Not on file   Tobacco Use    Smoking status: Former     Types: Cigarettes     Quit date: 10/17/2010     Years since quittin.0    Smokeless tobacco: Never    Tobacco comments:     occasional smoker   Vaping Use    Vaping Use: Never used   Substance and Sexual Activity    Alcohol use: No    Drug use: No    Sexual activity: Never     Partners: Male   Other Topics Concern    Parent/sibling w/ CABG, MI or angioplasty before 65F 55M? No   Social History Narrative    Not on file     Social Determinants of Health     Financial Resource Strain: Low Risk  (2023)    Financial Resource Strain     Within the past 12 months, have you or  your family members you live with been unable to get utilities (heat, electricity) when it was really needed?: No   Food Insecurity: Low Risk  (9/22/2023)    Food Insecurity     Within the past 12 months, did you worry that your food would run out before you got money to buy more?: No     Within the past 12 months, did the food you bought just not last and you didn t have money to get more?: No   Transportation Needs: Low Risk  (9/22/2023)    Transportation Needs     Within the past 12 months, has lack of transportation kept you from medical appointments, getting your medicines, non-medical meetings or appointments, work, or from getting things that you need?: No   Physical Activity: Not on file   Stress: Not on file   Social Connections: Not on file   Interpersonal Safety: Low Risk  (9/22/2023)    Interpersonal Safety     Do you feel physically and emotionally safe where you currently live?: Yes     Within the past 12 months, have you been hit, slapped, kicked or otherwise physically hurt by someone?: No     Within the past 12 months, have you been humiliated or emotionally abused in other ways by your partner or ex-partner?: No   Housing Stability: Low Risk  (9/22/2023)    Housing Stability     Do you have housing? : Yes     Are you worried about losing your housing?: No       No current outpatient medications on file.       Medications and history reviewed    Physical exam:  Vitals: /72   Temp 97.6  F (36.4  C) (Oral)   Resp 16   LMP 10/04/2012   SpO2 98%   BMI= There is no height or weight on file to calculate BMI.    Constitutional: Healthy, alert, non-distressed   Head: Normo-cephalic, atraumatic, no lesions, masses or tenderness   Cardiovascular: RRR, no new murmurs, +S1, +S2 heart sounds, no clicks, rubs or gallops   Respiratory: CTAB, no rales, rhonchi or wheezing, equal chest rise, good respiratory effort   Gastrointestinal: Soft, non-tender, non distended, no rebound rigidity or guarding, no  masses or hernias palpated   : Deferred  Musculoskeletal: Moves all extremities, normal  strength, no deformities noted   Skin: No suspicious lesions or rashes   Psychiatric: Mentation appears normal, affect appropriate   Hematologic/Lymphatic/Immunologic: Normal cervical and supraclavicular lymph nodes   Patient able to get up on table without difficulty.    Labs show:  No results found for this or any previous visit (from the past 24 hour(s)).    Assessment: Endoscopy  Plan: Pt cleared for anesthesia for proposed procedure.    Huber Mendoza DO

## 2023-10-26 NOTE — ANESTHESIA CARE TRANSFER NOTE
Patient: Sari Pollock    Procedure: Procedure(s):  Esophagogastroduodenoscopy with biopsy  Colonoscopy       Diagnosis: Abdominal pain, generalized [R10.84]  RUQ abdominal pain [R10.11]  Diagnosis Additional Information: No value filed.    Anesthesia Type:   MAC     Note:    Oropharynx: oropharynx clear of all foreign objects and spontaneously breathing  Level of Consciousness: drowsy  Oxygen Supplementation: room air    Independent Airway: airway patency satisfactory and stable  Dentition: dentition unchanged  Vital Signs Stable: post-procedure vital signs reviewed and stable  Report to RN Given: handoff report given  Patient transferred to: PACU    Handoff Report: Identifed the Patient, Identified the Reponsible Provider, Reviewed the pertinent medical history, Discussed the surgical course, Reviewed Intra-OP anesthesia mangement and issues during anesthesia, Set expectations for post-procedure period and Allowed opportunity for questions and acknowledgement of understanding      Vitals:  Vitals Value Taken Time   BP     Temp     Pulse     Resp     SpO2 97 % 10/26/23 1026   Vitals shown include unfiled device data.    Electronically Signed By: MARGARET Kapadia CRNA  October 26, 2023  10:27 AM

## 2023-10-26 NOTE — LETTER
Sari Pollock  [email or web address]  [place] [address]  November 1, 2023    Dear Sari,  This letter is to inform you of the results of your pathology report from your endoscopy.  Your pathology report was:  Final Diagnosis   A(1).  Duodenum, biopsy:  -Small intestinal mucosa with active chronic peptic duodenitis.  -Negative for H. Pylori organisms on immunohistochemical stains.  -Blunted villous architecture identified and no prominence in intraepithelial lymphocytes seen.  -Negative for luminal organisms.  -Negative for dysplasia or malignancy.  B(2).  Esophagus, distal, biopsy:  -Acute esophagitis with prominent reactive epithelial changes.  -Special stains for fungal organisms (PAS) is negative.  -Negative for intestinal metaplasia.  -Negative for eosinophilic esophagitis.  -Negative for dysplasia or malignancy.   The above findings are consistent with inflammatory process of your small intestine and esophagus.  No additional treatment or evaluation is necessary at this time based on these findings.      If you have further questions please don t hesitate to call our clinic at [phone].   Sincerely,     Huber Mendoza, DO

## 2023-10-26 NOTE — ANESTHESIA PREPROCEDURE EVALUATION
Anesthesia Pre-Procedure Evaluation    Patient: Sari Pollock   MRN: [id number] : 1960        Procedure : Procedure(s):  Esophagoscopy, gastroscopy, duodenoscopy (EGD), combined  Colonoscopy          Past Medical History:   Diagnosis Date     Anxiety      Ulcers       Past Surgical History:   Procedure Laterality Date     COLON SURGERY      intusseception     [facility] ORAL SURGERY PROCEDURE      2 teeth extracted      Allergies   Allergen Reactions     Aspirin Other (See Comments)     Severe stomach pain     Penicillins      Doesn't recall reaction     Compazine Itching and Rash     Arms and legs, some on abdomen      Social History     Tobacco Use     Smoking status: Former     Types: Cigarettes     Quit date: 10/17/2010     Years since quittin.0     Smokeless tobacco: Never     Tobacco comments:     occasional smoker   Substance Use Topics     Alcohol use: No      Wt Readings from Last 1 Encounters:   23 61.7 kg (136 lb)        Anesthesia Evaluation   Pt has had prior anesthetic. Type: MAC.    No history of anesthetic complications       ROS/MED HX  ENT/Pulmonary:     (+)                tobacco use, Past use,                      Neurologic:  - neg neurologic ROS     Cardiovascular:  - neg cardiovascular ROS     METS/Exercise Tolerance:     Hematologic:  - neg hematologic  ROS     Musculoskeletal:  - neg musculoskeletal ROS     GI/Hepatic:     (+) GERD, Asymptomatic on medication,                  Renal/Genitourinary:  - neg Renal ROS     Endo:  - neg endo ROS     Psychiatric/Substance Use:     (+) psychiatric history anxiety       Infectious Disease:  - neg infectious disease ROS     Malignancy:  - neg malignancy ROS     Other:  - neg other ROS        Physical Exam    Airway  airway exam normal      Mallampati: II   TM distance: > 3 FB   Neck ROM: full   Mouth opening: > 3 cm    Respiratory Devices and Support         Dental       (+) Minor Abnormalities - some fillings, tiny  "chips      Cardiovascular   cardiovascular exam normal       Rhythm and rate: regular and normal     Pulmonary   pulmonary exam normal        breath sounds clear to auscultation       OUTSIDE LABS:  CBC:   Lab Results   Component Value Date    WBC 6.5 09/22/2023    WBC 6.6 11/06/2014    HGB 13.0 09/22/2023    HGB 13.5 11/06/2014    HCT 38.8 09/22/2023    HCT 41.6 11/06/2014     09/22/2023     11/06/2014     BMP:   Lab Results   Component Value Date     09/22/2023     03/05/2009    POTASSIUM 4.3 09/22/2023    POTASSIUM 3.9 03/05/2009    CHLORIDE 104 09/22/2023    CHLORIDE 105 03/05/2009    CO2 21 (L) 09/22/2023    CO2 21 03/05/2009    BUN 14.3 09/22/2023    BUN 13 03/05/2009    CR 0.74 09/22/2023    CR 0.64 03/05/2009     (H) 09/22/2023    GLC 88 03/05/2009     COAGS: No results found for: \"PTT\", \"INR\", \"FIBR\"  POC:   Lab Results   Component Value Date    HCG Negative 03/05/2009     HEPATIC:   Lab Results   Component Value Date    ALBUMIN 4.7 09/22/2023    PROTTOTAL 7.5 09/22/2023    ALT 21 09/22/2023    AST 21 09/22/2023    ALKPHOS 64 09/22/2023    BILITOTAL 0.5 09/22/2023     OTHER:   Lab Results   Component Value Date    REBEKAH 9.9 09/22/2023    LIPASE 19 09/22/2023    TSH 2.24 11/06/2014       Anesthesia Plan    ASA Status:  2    NPO Status:  NPO Appropriate    Anesthesia Type: MAC.     - Reason for MAC: straight local not clinically adequate      Maintenance: TIVA.        Consents    Anesthesia Plan(s) and associated risks, benefits, and realistic alternatives discussed. Questions answered and patient/representative(s) expressed understanding.     - Discussed:     - Discussed with:  Patient       Use of blood products discussed: No .     Postoperative Care            Comments:    Other Comments: The risks and benefits of anesthesia, and the alternatives where applicable, have been discussed with the patient, and they wish to proceed.          Les Arteaga, APRN CRNA  "

## 2023-10-26 NOTE — ANESTHESIA POSTPROCEDURE EVALUATION
Patient: Sari Pollock    Procedure: Procedure(s):  Esophagogastroduodenoscopy with biopsy  Colonoscopy       Anesthesia Type:  MAC    Note:  Disposition: Outpatient   Postop Pain Control: Uneventful            Sign Out: Well controlled pain   PONV: No   Neuro/Psych: Uneventful            Sign Out: Acceptable/Baseline neuro status   Airway/Respiratory: Uneventful            Sign Out: Acceptable/Baseline resp. status   CV/Hemodynamics: Uneventful            Sign Out: Acceptable CV status   Other NRE: NONE   DID A NON-ROUTINE EVENT OCCUR? No    Event details/Postop Comments:  Pt was happy with anesthesia care.  No complications.  I will follow up with the pt if needed.           Last vitals:  Vitals Value Taken Time   /76 10/26/23 1050   Temp     Pulse 71 10/26/23 1050   Resp     SpO2 99 % 10/26/23 1044   Vitals shown include unfiled device data.    Electronically Signed By: MARGARET Kapadia CRNA  October 26, 2023  10:53 AM

## 2023-10-26 NOTE — DISCHARGE INSTRUCTIONS
[facility]    Home Care Following Endoscopy          Activity:  You have just undergone an endoscopic procedure usually performed with conscious sedation.  Do not work or operate machinery (including a car) for at least 12 hours.    I encourage you to walk and attempt to pass this air as soon as possible.    Diet:  Return to the diet you were on before your procedure but eat lightly for the first 12-24 hours.  Drink plenty of water.  Resume any regular medications unless otherwise advised by your physician.  Please begin any new medication prescribed as a result of your procedure as directed by your physician.   If you had any biopsy or polyp removed please refrain from aspirin or aspirin products for 2 days.  If on Coumadin please restart as instructed by your physician.   Pain:  You may take Tylenol as needed for pain.  Expected Recovery:  You can expect some mild abdominal fullness and/or discomfort due to the air used to inflate your intestinal tract. It is also normal to have a mild sore throat after upper endoscopy.    Call Your Physician if You Have:  After Upper Endoscopy:  Shoulder, back or chest pain.  Difficulty breathing or swallowing.  Vomiting blood.  After Colonoscopy:  Worsening persisting abdominal pain which is worse with activity.  Fevers (>101 degrees F), chills or shakes.  Passage of continued blood with bowel movements.     Any questions or concerns about your recovery, please call [phone] or after hours [phone] ([phone]) Nurse Advice Line.    Follow-up Care:  You DID have tissue sample(s) removed.  The  tissue sample(s) will be sent to pathology.    You should receive letter in your My Chart from Dr Mendoza with your results within 1-2 weeks. If you do not participate in My Chart a physical letter will come in the mail in 2-3 weeks.  Please call if you have not received a notification of your results.              Call [phone].

## 2023-10-31 ENCOUNTER — NURSE TRIAGE (OUTPATIENT)
Dept: FAMILY MEDICINE | Facility: CLINIC | Age: 63
End: 2023-10-31
Payer: COMMERCIAL

## 2023-10-31 DIAGNOSIS — K21.00 GASTROESOPHAGEAL REFLUX DISEASE WITH ESOPHAGITIS WITHOUT HEMORRHAGE: Primary | ICD-10-CM

## 2023-10-31 LAB
PATH REPORT.COMMENTS IMP SPEC: NORMAL
PATH REPORT.COMMENTS IMP SPEC: NORMAL
PATH REPORT.FINAL DX SPEC: NORMAL
PATH REPORT.GROSS SPEC: NORMAL
PATH REPORT.MICROSCOPIC SPEC OTHER STN: NORMAL
PATH REPORT.MICROSCOPIC SPEC OTHER STN: NORMAL
PATH REPORT.RELEVANT HX SPEC: NORMAL
PHOTO IMAGE: NORMAL

## 2023-10-31 NOTE — TELEPHONE ENCOUNTER
Nurse Triage SBAR    Is this a 2nd Level Triage? NO    Situation: Patient is continuing to have the RUQ pain. She had an EGD and colonoscopy done on 12/26/23. She is asking for a GI referral or next steps.    Background: Ongoing RUQ pain    Assessment: Patient still experiencing GI pain, Did ask patient if she is trying anything for the discomfort, states she is using a heating pad, and using Tylenol and Ibuprofen. Did ask patient to also await her results from recent scope.    Protocol Recommended Disposition:   See in Office Within 2 Weeks    Recommendation: Patient requesting a GI referral and any further suggestions on next steps.        Routed to provider    Does the patient meet one of the following criteria for ADS visit consideration? No      FERNANDEZ Monten[place], RN        Reason for Disposition   Abdominal pain is a chronic symptom (recurrent or ongoing AND lasting > 4 weeks)    Additional Information   Negative: SEVERE difficulty breathing (e.g., struggling for each breath, speaks in single words)   Negative: Shock suspected (e.g., cold/pale/clammy skin, too weak to stand, low BP, rapid pulse)   Negative: Difficult to awaken or acting confused (e.g., disoriented, slurred speech)   Negative: Passed out (i.e., lost consciousness, collapsed and was not responding)   Negative: Visible sweat on face or sweat is dripping down   Negative: Sounds like a life-threatening emergency to the triager   Negative: Followed an abdomen (stomach) injury   Negative: Chest pain   Negative: Abdominal pain and pregnant < 20 weeks   Negative: Abdominal pain and pregnant 20 or more weeks   Negative: Abdomen bloating or swelling are main symptoms   Negative: SEVERE abdominal pain (e.g., excruciating)   Negative: Pain lasting > 10 minutes and over 50 years old   Negative: Pain lasting > 10 minutes and over 40 years old and associated chest, arm, neck, upper back, or jaw pain   Negative: Pain lasting > 10 minutes and over 35 years old  and at least one cardiac risk factor (e.g., diabetes, high cholesterol, hypertension, obesity, smoker or strong family history of heart disease)   Negative: Pain lasting > 10 minutes and history of heart disease (i.e., heart attack, bypass surgery, angina, angioplasty, CHF)   Negative: Pain lasts > 10 minutes and difficulty breathing   Negative: Vomiting red blood or black (coffee ground) material  (Exception: Few streaks and only occurred once.)   Negative: Blood in bowel movements  (Exception: Blood on surface of BM with constipation.)   Negative: Black or tarry bowel movements  (Exception: Chronic-unchanged black-grey BMs AND is taking iron pills or Pepto-Bismol.)   Negative: Pregnant 20 weeks or more and new hand or face swelling   Negative: Unhealthy alcohol use, known or suspected   Negative: Patient wants to be seen   Negative: MILD TO MODERATE constant pain lasting > 2 hours   Negative: MILD TO MODERATE pain and not relieved by antacid medicine   Negative: Vomiting bile (green color)   Negative: Patient sounds very sick or weak to the triager   Negative: Vomiting and abdomen looks much more swollen than usual   Negative: White of the eyes have turned yellow (i.e., jaundice)   Negative: Fever > 103 F (39.4 C)   Negative: Fever > 101 F (38.3 C) and over 60 years of age   Negative: Fever > 100.0 F (37.8 C) and has diabetes mellitus or a weak immune system (e.g., HIV positive, cancer chemotherapy, organ transplant, splenectomy, chronic steroids)   Negative: Fever > 100.0 F (37.8 C) and bedridden (e.g., CVA, chronic illness, recovering from surgery)   Negative: MODERATE pain that comes and goes (cramps) lasts > 24 hours   Negative: MILD pain that comes and goes (cramps) > 72 hours  (Exception: This same abdominal pain is a chronic symptom recurrent or ongoing AND present > 4 weeks.)    Protocols used: Abdominal Pain - Upper-A-[place]

## 2023-10-31 NOTE — TELEPHONE ENCOUNTER
I recommend discussing her symptoms and recent testing results.  Schedule her a telephone or video-based visit this week or next week so we can discuss this.    Dharmesh Zepeda MD

## 2023-11-01 RX ORDER — SUCRALFATE 1 G/1
1 TABLET ORAL 4 TIMES DAILY
Qty: 40 TABLET | Refills: 0 | Status: SHIPPED | OUTPATIENT
Start: 2023-11-01 | End: 2023-12-14

## 2023-11-01 NOTE — TELEPHONE ENCOUNTER
Called and LM for patient to call back. Please relay below. Okay for TC or MA to use a gray slot next week.     Loren Florian MA

## 2023-11-01 NOTE — TELEPHONE ENCOUNTER
Patient returned call, scheduled telephone visit on 11/7 with Dr. Zepeda. Transferred to triage for patient to discuss pain with RN.

## 2023-11-01 NOTE — TELEPHONE ENCOUNTER
Patient is having pain like she had before her EGD.    She is wondering if there is anything she can do for her pain until she has her appointment.    She states the pain can go from mild to sharp stabbing pain. This pain is similar to what she had before- it has not increased or gotten worse.    The only things that help are drinking water and resting.    She states the pain is not new since surgery.    Please advise if there is anything she can use for the pain.    Michelle Martínez RN on 11/1/2023 at 3:45 PM

## 2023-11-02 NOTE — TELEPHONE ENCOUNTER
Will have her try sucralfate four times daily.  Take 1 hour before meals and at bedtime.  Will need to take Protonix 30 min prior to sucralfate.    Will have staff notify patient.    Dharmesh Zepeda MD

## 2023-11-02 NOTE — TELEPHONE ENCOUNTER
RN TRIAGE CALL:    Patient Contact    Attempt # 1    Was call answered?  No.  Left message on voicemail with information to call any triage nurse at [phone] regarding her conversation with one of our triage nurses yesterday.    Saba Winston RN

## 2023-11-03 NOTE — TELEPHONE ENCOUNTER
RN TRIAGE CALL:    Patient Contact    Attempt # 2    Was call answered?  No.  Left message on voicemail with information to call any one of the triage nurses back regarding his message a few days earlier.    Saba Winston RN

## 2023-11-06 NOTE — TELEPHONE ENCOUNTER
RN TRIAGE CALL:    Patient Contact    Attempt # 3    Was call answered?  No.  Left message on voicemail with information to call any one of the triage nurses back at [phone] regarding a message that was received last week and Dr. Duane BARCLAY's documented recommendation.    My Chart message sent.  Will close encounter at this time.    Saba Winston RN

## 2023-11-07 ENCOUNTER — VIRTUAL VISIT (OUTPATIENT)
Dept: FAMILY MEDICINE | Facility: CLINIC | Age: 63
End: 2023-11-07
Payer: COMMERCIAL

## 2023-11-07 DIAGNOSIS — K29.80 DUODENITIS: Primary | ICD-10-CM

## 2023-11-07 PROCEDURE — 99213 OFFICE O/P EST LOW 20 MIN: CPT | Mod: 93 | Performed by: FAMILY MEDICINE

## 2023-11-07 PROCEDURE — 96127 BRIEF EMOTIONAL/BEHAV ASSMT: CPT | Mod: 93 | Performed by: FAMILY MEDICINE

## 2023-11-07 ASSESSMENT — ANXIETY QUESTIONNAIRES
2. NOT BEING ABLE TO STOP OR CONTROL WORRYING: NEARLY EVERY DAY
1. FEELING NERVOUS, ANXIOUS, OR ON EDGE: SEVERAL DAYS
IF YOU CHECKED OFF ANY PROBLEMS ON THIS QUESTIONNAIRE, HOW DIFFICULT HAVE THESE PROBLEMS MADE IT FOR YOU TO DO YOUR WORK, TAKE CARE OF THINGS AT HOME, OR GET ALONG WITH OTHER PEOPLE: SOMEWHAT DIFFICULT
GAD7 TOTAL SCORE: 11
6. BECOMING EASILY ANNOYED OR IRRITABLE: NEARLY EVERY DAY
5. BEING SO RESTLESS THAT IT IS HARD TO SIT STILL: NOT AT ALL
7. FEELING AFRAID AS IF SOMETHING AWFUL MIGHT HAPPEN: NOT AT ALL
GAD7 TOTAL SCORE: 11
4. TROUBLE RELAXING: SEVERAL DAYS
3. WORRYING TOO MUCH ABOUT DIFFERENT THINGS: NEARLY EVERY DAY

## 2023-11-07 ASSESSMENT — PATIENT HEALTH QUESTIONNAIRE - PHQ9: SUM OF ALL RESPONSES TO PHQ QUESTIONS 1-9: 10

## 2023-11-07 NOTE — RESULT ENCOUNTER NOTE
Results discussed during clinic visit.    Dharmesh Zepeda MD  Additional Notes: Patient consent was obtained to proceed with the visit and recommended plan of care after discussion of all risks and benefits, including the risks of COVID-19 exposure. Detail Level: Simple

## 2023-11-07 NOTE — PROGRESS NOTES
Sari is a 62 year old who is being evaluated via a billable telephone visit.      What phone number would you like to be contacted at? [phone]   How would you like to obtain your AVS? Sarah    [place] Location (provider location):  On-site    Assessment & Plan     ASSESSMENT/ORDERS:    ICD-10-CM    1. Duodenitis  K29.80 omeprazole (PRILOSEC) 20 MG DR capsule        PLAN:  1.  Trial of PPI for 4-6 weeks.  Would try H2 blocker if side effects of ompeparzole.  Continue Carafate for 10 days.  2.  Can do HIDA scan if she wants, but discussed concerns with false positive with no link to food intake.  3.  Can also trial wheat avoidance as she may have possible gluten intolerance/celiac disease, but this is less likely.              Depression Screening Follow Up        11/7/2023    10:44 AM   PHQ   PHQ-9 Total Score 10   Q9: Thoughts of better off dead/self-harm past 2 weeks Not at all         Follow Up Actions Taken           Dharmesh Zepeda MD  [facility]   Sari is a 62 year old, presenting for the following health issues:  Results (Endoscopy and Colonoscpoy) and Pain (Pain management)  - Recheck gallbladder, still feels like she is having issues        11/7/2023    10:39 AM   Additional Questions   Roomed by Bobby CHRISTINA   Accompanied by Self         11/7/2023    10:39 AM   Patient Reported Additional Medications   Patient reports taking the following new medications None       Musculoskeletal Problem    History of Present Illness       Reason for visit:  Pain Follow up        Medication Followup of Protonix  Taking Medication as prescribed: NO-Only took for 5 days. Has been a few weeks  Side Effects:  Abdominal burning  Medication Helping Symptoms:  NO-Made pain in other places but did not help with original pain    Medication Followup of Sucralfate  Taking Medication as prescribed: yes  Side Effects:  Casuing headaches and right sided pain on the side she is usually  having pain in.  Medication Helping Symptoms:  Only has been taking for a few days    Pain History:  When did you first notice your pain? At the end of September   Have you seen this provider for your pain in the past? Yes   Where in your body do you have pain? Right side of the belly button, a little lower and radiates up under the rib cage. About a 4 inch vertical area.   Are you seeing anyone else for your pain? No                3/17/2009    11:00 AM 11/7/2023    10:44 AM   PHQ-9 SCORE   PHQ-9 Total Score 12    PHQ-9 Total Score  10           11/7/2023    10:46 AM   ALEXEY-7 SCORE   Total Score 11           11/7/2023    10:47 AM   PEG Score   PEG Total Score 7       Chronic Pain Follow Up:    PDMP Review       None          Last CSA Agreement:   CSA -- Patient Level:    CSA: None found at the patient level.       Last UDS:           Reviewed EGD results.  Duodenitis.  Not better yet, but had to stop Protonix due to diarrhea.  Is on day 1 of Carafate, not sure if it is helping.    She wonders about gallbladder disease.  Pain not related to food intake.  Has improved with caffeine cessation.     Review of Systems         Objective           Vitals:  No vitals were obtained today due to virtual visit.    Physical Exam   healthy, alert, and no distress  PSYCH: Alert and oriented times 3; coherent speech, normal   rate and volume, able to articulate logical thoughts, able   to abstract reason, no tangential thoughts, no hallucinations   or delusions  Her affect is normal  RESP: No cough, no audible wheezing, able to talk in full sentences  Remainder of exam unable to be completed due to telephone visits                Phone call duration: 19 minutes

## 2023-11-26 ENCOUNTER — HEALTH MAINTENANCE LETTER (OUTPATIENT)
Age: 63
End: 2023-11-26

## 2023-12-04 ENCOUNTER — TELEPHONE (OUTPATIENT)
Dept: FAMILY MEDICINE | Facility: CLINIC | Age: 63
End: 2023-12-04
Payer: COMMERCIAL

## 2023-12-04 NOTE — LETTER
December 6, 2023    To Whom it may Concern:    Sari Pollock may return to work for 5 hours per day starting Thursday, December 7, 2023     If you have any further questions, please contact me at the number above.    Sincerely,        Dharmesh Zepeda MD

## 2023-12-04 NOTE — TELEPHONE ENCOUNTER
Forms/Letter Request    Type of form/letter: Work, To be able to return to work for 5 hours a day.    Have you been seen for this request: Yes     Do we have the form/letter: No    When is form/letter needed by: asap    How would you like the form/letter returned:     Patient Notified form requests are processed in 3-5 business days:Yes    Could we send this information to you in [place] or would you prefer to receive a phone call?:   Patient would prefer a phone call   Okay to leave a detailed message?: Yes at Cell number on file:    Telephone Information:   Mobile [phone]

## 2023-12-12 ENCOUNTER — MYC MEDICAL ADVICE (OUTPATIENT)
Dept: FAMILY MEDICINE | Facility: CLINIC | Age: 63
End: 2023-12-12
Payer: COMMERCIAL

## 2023-12-12 DIAGNOSIS — K21.00 GASTROESOPHAGEAL REFLUX DISEASE WITH ESOPHAGITIS WITHOUT HEMORRHAGE: ICD-10-CM

## 2023-12-14 RX ORDER — SUCRALFATE 1 G/1
1 TABLET ORAL 4 TIMES DAILY
Qty: 40 TABLET | Refills: 0 | Status: SHIPPED | OUTPATIENT
Start: 2023-12-14

## 2023-12-15 NOTE — TELEPHONE ENCOUNTER
Patient informed via mychart to schedule. 12/20 reminder if not read to send letter.     Closing encounter.   Loren Florian MA

## 2023-12-15 NOTE — TELEPHONE ENCOUNTER
Medication refilled.  Recommend follow-up appointment in January    Will have staff notify patient.    Dharmesh Zepeda MD

## 2024-03-11 DIAGNOSIS — K29.80 DUODENITIS: ICD-10-CM

## 2024-03-12 NOTE — TELEPHONE ENCOUNTER
Patient has been notified of the note below via [employer]. Reminder set for 3 days to send letter if not read.

## 2024-03-12 NOTE — TELEPHONE ENCOUNTER
Medication refilled x1.  Needs appointment for further refills.  Will have staff notify patient, and if appointment is made, next refill will cover patient to the date of the scheduled appointment.    Dharmesh Zepeda MD

## 2024-04-30 ENCOUNTER — OFFICE VISIT (OUTPATIENT)
Dept: FAMILY MEDICINE | Facility: CLINIC | Age: 64
End: 2024-04-30
Payer: COMMERCIAL

## 2024-04-30 ENCOUNTER — HOSPITAL ENCOUNTER (OUTPATIENT)
Dept: GENERAL RADIOLOGY | Facility: CLINIC | Age: 64
Discharge: HOME | End: 2024-04-30
Payer: COMMERCIAL

## 2024-04-30 VITALS
HEIGHT: 62 IN | BODY MASS INDEX: 25.58 KG/M2 | WEIGHT: 139 LBS | HEART RATE: 72 BPM | DIASTOLIC BLOOD PRESSURE: 70 MMHG | OXYGEN SATURATION: 100 % | SYSTOLIC BLOOD PRESSURE: 130 MMHG | TEMPERATURE: 97.9 F

## 2024-04-30 DIAGNOSIS — S46.002A INJURY OF LEFT ROTATOR CUFF, INITIAL ENCOUNTER: ICD-10-CM

## 2024-04-30 DIAGNOSIS — M25.512 ACUTE PAIN OF LEFT SHOULDER: ICD-10-CM

## 2024-04-30 DIAGNOSIS — M25.512 ACUTE PAIN OF LEFT SHOULDER: Primary | ICD-10-CM

## 2024-04-30 PROCEDURE — 99213 OFFICE O/P EST LOW 20 MIN: CPT

## 2024-04-30 PROCEDURE — 73030 X-RAY EXAM OF SHOULDER: CPT | Mod: LT

## 2024-04-30 ASSESSMENT — PAIN SCALES - GENERAL: PAINLEVEL: SEVERE PAIN (7)

## 2024-04-30 NOTE — RESULT ENCOUNTER NOTE
Hello -    Here are my comments about the recent results. IMPRESSION: No acute fracture or malalignment. No significant  degenerative changes.       Please let us know if you have any questions or concerns.    Regards,  Shey Shipman PA-C

## 2024-04-30 NOTE — PROGRESS NOTES
"  Assessment & Plan     Acute pain of left shoulder  - Physical Therapy  Referral; Future  - XR Shoulder Left 2 Views; Future    Injury of left rotator cuff, initial encounter    Ordering of each unique test  I spent a total of 20 minutes on the day of the visit.   Time spent by me doing chart review, history and exam, documentation and further activities per the note      BMI  Estimated body mass index is 25.42 kg/m  as calculated from the following:    Height as of this encounter: 1.575 m (5' 2\").    Weight as of this encounter: 63 kg (139 lb).         See Patient Instructions  Patient Instructions   Left shoulder pain    Offered x-ray for further investigation    Follow up with physical therapy     Lidocaine patches for 12 hours on, 12 hours off     Tylenol as needed     Diclofenac gel OTC 4 times a day scheduled     Heat pack for 10 minutes followed by exercises 4-5 times a day     Follow up with any new, worsening, or persistent symptoms     Go to the ER in the case of an emergency      What is a rotator cuff injury?  A rotator cuff injury is a condition that can cause shoulder pain. The rotator cuff is made up of 4 shoulder muscles and their tendons. Tendons are strong bands of tissue that connect muscles to bones.  People can get different types of rotator cuff injuries. One common injury is \"tendinopathy,\" which is a problem with 1 or more of the tendons. When a person has tendinopathy, the tendons are not inflamed or swollen. If they do get inflamed or swollen, doctors call it \"tendinitis.\" Tendinopathy and tendinitis can happen if you:  ?Do a job or activity that involves repeated use of your shoulder muscles (like window washing, painting, or construction)  ?Do sports that involve throwing a lot (like pitching a baseball) or raising your arm over your head (like swimming)  ?Do any exercises or activities that make some of your shoulder muscles stronger than others  Another type of rotator cuff " injury is a tear in a tendon or muscle. Tears can happen if you fall on your shoulder or move your shoulder too fast and with too much force. Tears can also happen as a tendon wears out over time.  What are the symptoms of a rotator cuff injury?  Most people with tendinopathy or tendinitis have pain where the shoulder meets the top of the arm and down the outer part of the upper arm. The pain is usually worse when they try to move their arm over their head or lie on their shoulder.    Will I need tests?  You might. Your doctor or nurse will talk with you and do an exam. If they suspect you have a tear, you might need an imaging test of the shoulder, such as an MRI scan or ultrasound. Imaging tests create pictures of the inside of the body.  How is a rotator cuff injury treated?  Many rotator cuff injuries get better on their own, but they can take months to heal completely. To help your shoulder get better, you can:  ?Rest your shoulder - Avoid doing activities that cause pain or strain your shoulder, such as raising your arm over your head or reaching behind you. In general, try to keep your arm down, close to, and in front of your body. But you can move your shoulder gently if you need to.  ?Ice your shoulder - Put a cold gel pack, bag of ice, or bag of frozen vegetables on the injured area every 1 to 2 hours, for 15 minutes each time, as needed. Put a thin towel between the ice (or other cold object) and your skin. Ice can be especially helpful after you've been using your shoulder a lot for work, sports, or chores.  ?Take medicine to reduce the pain and swelling - Your doctor or nurse might recommend that you take ibuprofen (sample brand names: Advil, Motrin) or naproxen (sample brand names: Aleve, Naprosyn).  Some tears, especially large tears, might need to be treated with surgery.  Is there anything I can do on my own to feel better?  Maybe. Depending on your injury, you might be able to do specific exercises  to help your shoulder feel better. Ask your doctor or nurse which exercises you should do, when to start them, and how often to do them. They might suggest working with a physical therapist (exercise expert).  Some exercises help keep your shoulder from getting too stiff. One of these is called the pendulum stretch. To do this exercise, let your arm relax and hang down. Move your arm back and forth, then side to side, and then around in small circles in both directions (figure 1).  Other exercises can help strengthen your shoulder muscles. Your doctor, nurse, or physical therapist can show you how to do these types of exercises.  When you do shoulder exercises, it's important to:  ?Warm up your shoulder first. You can do this by taking a hot shower or bath, putting a warm towel on your shoulder, massaging the area, or doing gentle movements.  ?Start slowly and make the exercises harder over time. Follow all instructions for how often to do your exercises and how many times to repeat each motion.  ?Know that some soreness is normal. If you have sharp or tearing pain, stop what you're doing and let your doctor or nurse know.  What if my symptoms don't get better?  If your symptoms don't get better, talk with your doctor or nurse about other possible treatments, such as:  ?Getting a shot of medicine into your shoulder  ?Surgery      Patient understood and verbally consented to the treatment plan. Discussed symptoms that would warrant an urgent or emergent visit. All of the patients' questions were answered. Patient was instructed to contact the clinic if questions or concerns arise. Recommend follow up appointments if symptoms worsen or fail to improve. Recommend follow up as needed. Recommend ER in the case of an emergency.    Shey Shipman PA-C    Please note: Voice recognition software may have been used in preparing this note, unintended word substitutions may be present.          Mitchell Guo is a 63 year  old, presenting for the following health issues:  Shoulder Pain        4/30/2024     1:17 PM   Additional Questions   Roomed by Amanda MILLER     History of Present Illness       Reason for visit:  Severe shoulder pain ,left shoulder  Symptom onset:  1-2 weeks ago  Symptoms include:  Pinching  Symptom intensity:  Severe  Symptom progression:  Staying the same  Had these symptoms before:  No  What makes it worse:  Reaching above head  What makes it better:  Tylenol,a lot of it    She eats 2-3 servings of fruits and vegetables daily.She consumes 1 sweetened beverage(s) daily.She exercises with enough effort to increase her heart rate 30 to 60 minutes per day.  She exercises with enough effort to increase her heart rate 5 days per week.   She is taking medications regularly.     Patient presents for left-sided shoulder pain.  Reports that she has 2 jobs.  She does repetitive motions and overhead motions.  She reports that she was reaching her left arm with an outstretched hand and pulling something back and heard a sensation and felt a sensation and pain in her shoulder since.  She has been using heating pads to been helpful.  She reports that she has inability to fully flex and abduct her left shoulder.  Injury occurred on Sunday.  Negative for numbness and tingling down the arm.  No other recent injuries or falls.  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  Patient is right-handed.  Current pain rated 7 out of 10.  She has not had Tylenol since midnight.  She initially did ice and now is doing heat.  No known history of arthritis in the left shoulder.  No prior x-rays.  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  Patient would like x-ray today.            Review of Systems  Constitutional, HEENT, cardiovascular, pulmonary, GI, , musculoskeletal, neuro, skin, endocrine and psych systems are negative, except as otherwise noted.      Objective    /70    "Pulse 72   Temp 97.9  F (36.6  C) (Temporal)   Ht 1.575 m (5' 2\")   Wt 63 kg (139 lb)   LMP 10/04/2012   SpO2 100%   BMI 25.42 kg/m    Body mass index is 25.42 kg/m .  Physical Exam   GENERAL: alert and no distress  EYES: Eyes grossly normal to inspection, PERRL and conjunctivae and sclerae normal  NECK negative for tenderness palpation, muscle spasm noted over the left posterior neck.  MS: no gross musculoskeletal defects noted, no edema.  Left shoulder: Moderately reduced range of motion with shoulder flexion and extension.  50% reduction with abduction.  Tenderness to palpation noted over the AC joint area.  Negative tenderness palpation over the deltoid.  Positive empty can test.  Negative Hawking's test.  There is no palpable crepitus or bony step-offs noted.  SKIN: no suspicious lesions or rashes  NEURO: Normal strength and tone, mentation intact and speech normal  PSYCH: mentation appears normal, affect normal/bright    Admission on 10/26/2023, Discharged on 10/26/2023   Component Date Value Ref Range Status    Case Report 10/26/2023    Final                    Value:Surgical Pathology Report                         Case: [id number]                                  Authorizing Provider:  Huber Mendoza,   Collected:           10/26/2023 09:57 AM                                 DO                                                                           Ordering Location:     [facility]          Received:            10/26/2023 10:43 AM                                 [facility]                                                          Pathologist:           Maged Mohamud MD PhD                                                      Specimens:   A) - Small Intestine, Duodenum                                                                      B) - Esophagus, Distal                                                                     Final Diagnosis 10/26/2023    Final              "       Value:This result contains rich text formatting which cannot be displayed here.    Clinical Information 10/26/2023    Final                    Value:This result contains rich text formatting which cannot be displayed here.    Gross Description 10/26/2023    Final                    Value:This result contains rich text formatting which cannot be displayed here.    Microscopic Description 10/26/2023    Final                    Value:This result contains rich text formatting which cannot be displayed here.    Special Stains 10/26/2023    Final                    Value:This result contains rich text formatting which cannot be displayed here.    Performing Labs 10/26/2023    Final                    Value:This result contains rich text formatting which cannot be displayed here.    Upper GI Endoscopy 10/26/2023    Final                    Value:[address] [address]  _______________________________________________________________________________  Patient Name: Sari Pollock          Procedure Date: 10/26/2023 9:46 AM  MRN: [id number]                       Account Number: [id number]  YOB: 1960             Admit Type: Outpatient  Age: 62                               Room: [place] [address]  Gender: Female                        Note Status: Finalized  Attending MD: DULCE MACKAY MD,  Total Sedation Time:   _______________________________________________________________________________     Procedure:             Upper GI endoscopy  Indications:           Abdominal pain  Providers:             DULCE MACKAY MD  Referring MD:          JERICHO GONZALES MD  Medicines:             Monitored Anesthesia Care  Complications:         No immediate complications.  _______________________________________________________________________________  Proced                          ure:             Pre-Anesthesia Assessment:                          - Prior to the procedure, a History and Physical was                          performed, and patient medications, allergies and                          sensitivities were reviewed. The patient's tolerance                          of previous anesthesia was reviewed.                         - The risks and benefits of the procedure and the                          sedation options and risks were discussed with the                          patient. All questions were answered and informed                          consent was obtained.                         - After reviewing the risks and benefits, the patient                          was deemed in satisfactory condition to undergo the                          procedure.                         After obtaining informed consent, the endoscope was                          passed under direct vision. Throughout the procedure,                          the patient's blood p                          ressure, pulse, and oxygen                          saturations were monitored continuously. The Endoscope                          was introduced through the mouth, and advanced to the                          third part of duodenum. The upper GI endoscopy was                          accomplished without difficulty. The patient tolerated                          the procedure well.                                                                                   Findings:       Mild erythema was found in the distal esophagus. Biopsies were taken        with a cold forceps for histology.       The entire examined stomach was normal.       Mildly erythematous mucosa without active bleeding and with no stigmata        of bleeding was found in the second portion of the duodenum. Biopsies        were taken with a cold forceps for histology.       The exam was otherwise without abnormality.                                                                                    Impression:                                      - Erythema in the distal esophagus. Biopsied.                         - Normal stomach.                         - Erythematous duodenopathy. Biopsied.                         - The examination was otherwise normal.  Recommendation:        - Discharge patient to home.                         - Resume previous diet.                         - Continue present medications.                         - Await pathology results.                                                                                   Procedure Code(s):       --- Professional ---       89816, Esophagogastroduodenoscopy, flexible, transoral; with biopsy,        single or multiple    CPT copyright 2022 American Medical Association. All rights reserved.    The codes documented in this report are preliminary and upon  review may   be revised to meet current compliance requirements.    __________________________  DULCE MACKAY MD  10/26/2023 10:01:47 AM  Number of Addenda: 0    Note Initiated On: 10/26/202                          3 9:46 AM      COLONOSCOPY 10/26/2023    Final                    Value:[address] [address]  _______________________________________________________________________________  Patient Name: Sari Pollock          Procedure Date: 10/26/2023 10:00 AM  MRN: [id number]                       Account Number: [id number]  YOB: 1960             Admit Type: Outpatient  Age: 62                               Room: [place] [address]  Gender: Female                        Note Status: Finalized  Attending MD: DULCE MACKAY MD,  Total Sedation Time:   _______________________________________________________________________________     Procedure:             Colonoscopy  Indications:           Abdominal pain in the right lower quadrant  Providers:             DULCE MACKAY MD  Referring MD:           JERICHO GONZALES MD  Medicines:             Monitored Anesthesia Care  Complications:         No immediate complications.  _________________________________________________________________                          ______________  Procedure:             Pre-Anesthesia Assessment:                         - Prior to the procedure, a History and Physical was                          performed, and patient medications, allergies and                          sensitivities were reviewed. The patient's tolerance                          of previous anesthesia was reviewed.                         - The risks and benefits of the procedure and the                          sedation options and risks were discussed with the                          patient. All questions were answered and informed                          consent was obtained.                         - After reviewing the risks and benefits, the patient                          was deemed in satisfactory condition to undergo the                          procedure.                         After obtaining informed consent, the colonoscope was                          passed under direct vision. Throughout the procedure,                                                    the patient's blood pressure, pulse, and oxygen                          saturations were monitored continuously. The                          Colonoscope was introduced through the anus and                          advanced to the cecum, identified by appendiceal                          orifice and ileocecal valve. The colonoscopy was                          performed without difficulty. The patient tolerated                          the procedure well. The quality of the bowel                          preparation was good.                                                                                   Findings:       The perianal and digital rectal examinations were normal.       The  entire examined colon appeared normal.                                                                                   Impression:            - The entire examined colon is normal.                         - No specimens collected.  Recommendation:        - Discharge patient to home.                                                   - High fiber diet and low fat diet.                         - Continue present medications.                         - Repeat colonoscopy in 10 years for screening                          purposes.                                                                                   Procedure Code(s):       --- Professional ---       74980, Colonoscopy, flexible; diagnostic, including collection of        specimen(s) by brushing or washing, when performed (separate procedure)    CPT copyright 2022 American Medical Association. All rights reserved.    The codes documented in this report are preliminary and upon  review may   be revised to meet current compliance requirements.    __________________________  DULCE MACKAY MD  10/26/2023 10:19:16 AM  Number of Addenda: 0    Note Initiated On: 10/26/2023 10:00 AM       No results found for any visits on 04/30/24.  No results found.        Signed Electronically by: Shey Shipman PA-C

## 2024-04-30 NOTE — PATIENT INSTRUCTIONS
"Left shoulder pain    Offered x-ray for further investigation    Follow up with physical therapy     Lidocaine patches for 12 hours on, 12 hours off     Tylenol as needed     Diclofenac gel OTC 4 times a day scheduled     Heat pack for 10 minutes followed by exercises 4-5 times a day     Follow up with any new, worsening, or persistent symptoms     Go to the ER in the case of an emergency      What is a rotator cuff injury?  A rotator cuff injury is a condition that can cause shoulder pain. The rotator cuff is made up of 4 shoulder muscles and their tendons. Tendons are strong bands of tissue that connect muscles to bones.  People can get different types of rotator cuff injuries. One common injury is \"tendinopathy,\" which is a problem with 1 or more of the tendons. When a person has tendinopathy, the tendons are not inflamed or swollen. If they do get inflamed or swollen, doctors call it \"tendinitis.\" Tendinopathy and tendinitis can happen if you:  ?Do a job or activity that involves repeated use of your shoulder muscles (like window washing, painting, or construction)  ?Do sports that involve throwing a lot (like pitching a baseball) or raising your arm over your head (like swimming)  ?Do any exercises or activities that make some of your shoulder muscles stronger than others  Another type of rotator cuff injury is a tear in a tendon or muscle. Tears can happen if you fall on your shoulder or move your shoulder too fast and with too much force. Tears can also happen as a tendon wears out over time.  What are the symptoms of a rotator cuff injury?  Most people with tendinopathy or tendinitis have pain where the shoulder meets the top of the arm and down the outer part of the upper arm. The pain is usually worse when they try to move their arm over their head or lie on their shoulder.    Will I need tests?  You might. Your doctor or nurse will talk with you and do an exam. If they suspect you have a tear, you might " need an imaging test of the shoulder, such as an MRI scan or ultrasound. Imaging tests create pictures of the inside of the body.  How is a rotator cuff injury treated?  Many rotator cuff injuries get better on their own, but they can take months to heal completely. To help your shoulder get better, you can:  ?Rest your shoulder - Avoid doing activities that cause pain or strain your shoulder, such as raising your arm over your head or reaching behind you. In general, try to keep your arm down, close to, and in front of your body. But you can move your shoulder gently if you need to.  ?Ice your shoulder - Put a cold gel pack, bag of ice, or bag of frozen vegetables on the injured area every 1 to 2 hours, for 15 minutes each time, as needed. Put a thin towel between the ice (or other cold object) and your skin. Ice can be especially helpful after you've been using your shoulder a lot for work, sports, or chores.  ?Take medicine to reduce the pain and swelling - Your doctor or nurse might recommend that you take ibuprofen (sample brand names: Advil, Motrin) or naproxen (sample brand names: Aleve, Naprosyn).  Some tears, especially large tears, might need to be treated with surgery.  Is there anything I can do on my own to feel better?  Maybe. Depending on your injury, you might be able to do specific exercises to help your shoulder feel better. Ask your doctor or nurse which exercises you should do, when to start them, and how often to do them. They might suggest working with a physical therapist (exercise expert).  Some exercises help keep your shoulder from getting too stiff. One of these is called the pendulum stretch. To do this exercise, let your arm relax and hang down. Move your arm back and forth, then side to side, and then around in small circles in both directions (figure 1).  Other exercises can help strengthen your shoulder muscles. Your doctor, nurse, or physical therapist can show you how to do these  types of exercises.  When you do shoulder exercises, it's important to:  ?Warm up your shoulder first. You can do this by taking a hot shower or bath, putting a warm towel on your shoulder, massaging the area, or doing gentle movements.  ?Start slowly and make the exercises harder over time. Follow all instructions for how often to do your exercises and how many times to repeat each motion.  ?Know that some soreness is normal. If you have sharp or tearing pain, stop what you're doing and let your doctor or nurse know.  What if my symptoms don't get better?  If your symptoms don't get better, talk with your doctor or nurse about other possible treatments, such as:  ?Getting a shot of medicine into your shoulder  ?Surgery      Patient understood and verbally consented to the treatment plan. Discussed symptoms that would warrant an urgent or emergent visit. All of the patients' questions were answered. Patient was instructed to contact the clinic if questions or concerns arise. Recommend follow up appointments if symptoms worsen or fail to improve. Recommend follow up as needed. Recommend ER in the case of an emergency.    Shey Shipman PA-C    Please note: Voice recognition software may have been used in preparing this note, unintended word substitutions may be present.

## 2024-11-07 ENCOUNTER — TELEPHONE (OUTPATIENT)
Dept: FAMILY MEDICINE | Facility: CLINIC | Age: 64
End: 2024-11-07

## 2024-11-07 NOTE — TELEPHONE ENCOUNTER
Patient Quality Outreach    Patient is due for the following:   Cervical Cancer Screening - PAP Needed  Physical Preventive Adult Physical    Next Steps:   Schedule a Adult Preventative    Type of outreach:    Sent [facility] message.      Questions for provider review:    None           Savanna Ch MA

## 2024-11-07 NOTE — LETTER
December 17, 2024    To  Sari Pollock  [address]  [place] [address]    Your team at [facility] cares about your health. We have reviewed your chart and based on our findings; we are making the following recommendations to better manage your health.     You are in particular need of attention regarding the following:     PREVENTATIVE VISIT: Physical    If you have already completed these items, please contact the clinic via phone or   MyChart so your care team can review and update your records. Thank you for   choosing [facility] for your healthcare needs. For any questions,   concerns, or to schedule an appointment please contact our clinic.    Healthy Regards,      Your [facility] Care Team

## 2024-11-11 ENCOUNTER — PATIENT OUTREACH (OUTPATIENT)
Dept: CARE COORDINATION | Facility: CLINIC | Age: 64
End: 2024-11-11
Payer: COMMERCIAL

## 2024-12-17 NOTE — TELEPHONE ENCOUNTER
Patient Quality Outreach    Patient is due for the following:   Physical Preventive Adult Physical    Action(s) Taken:   Schedule a Adult Preventative    Type of outreach:    Sent letter.    Questions for provider review:    None           Savanna Ch MA

## 2025-01-04 ENCOUNTER — HEALTH MAINTENANCE LETTER (OUTPATIENT)
Age: 65
End: 2025-01-04

## 2025-01-06 ENCOUNTER — HOSPITAL ENCOUNTER (OUTPATIENT)
Dept: CT IMAGING | Facility: CLINIC | Age: 65
Discharge: HOME | End: 2025-01-06
Payer: COMMERCIAL

## 2025-01-06 ENCOUNTER — OFFICE VISIT (OUTPATIENT)
Dept: FAMILY MEDICINE | Facility: CLINIC | Age: 65
End: 2025-01-06
Payer: COMMERCIAL

## 2025-01-06 VITALS
RESPIRATION RATE: 20 BRPM | OXYGEN SATURATION: 97 % | WEIGHT: 145.6 LBS | HEART RATE: 67 BPM | BODY MASS INDEX: 26.63 KG/M2 | DIASTOLIC BLOOD PRESSURE: 76 MMHG | SYSTOLIC BLOOD PRESSURE: 140 MMHG

## 2025-01-06 DIAGNOSIS — R10.31 RLQ ABDOMINAL PAIN: ICD-10-CM

## 2025-01-06 DIAGNOSIS — K59.00 CONSTIPATION, UNSPECIFIED CONSTIPATION TYPE: ICD-10-CM

## 2025-01-06 DIAGNOSIS — R14.0 BLOATING: ICD-10-CM

## 2025-01-06 DIAGNOSIS — K57.30 DIVERTICULOSIS OF LARGE INTESTINE WITHOUT HEMORRHAGE: ICD-10-CM

## 2025-01-06 DIAGNOSIS — R10.31 RLQ ABDOMINAL PAIN: Primary | ICD-10-CM

## 2025-01-06 DIAGNOSIS — K21.00 GASTROESOPHAGEAL REFLUX DISEASE WITH ESOPHAGITIS WITHOUT HEMORRHAGE: ICD-10-CM

## 2025-01-06 PROCEDURE — 250N000009 HC RX 250

## 2025-01-06 PROCEDURE — 99213 OFFICE O/P EST LOW 20 MIN: CPT

## 2025-01-06 PROCEDURE — 87507 IADNA-DNA/RNA PROBE TQ 12-25: CPT

## 2025-01-06 PROCEDURE — 250N000011 HC RX IP 250 OP 636

## 2025-01-06 PROCEDURE — 74177 CT ABD & PELVIS W/CONTRAST: CPT

## 2025-01-06 RX ORDER — SIMETHICONE 125 MG
125 TABLET,CHEWABLE ORAL
Qty: 20 TABLET | Refills: 0 | Status: SHIPPED | OUTPATIENT
Start: 2025-01-06 | End: 2025-01-16

## 2025-01-06 RX ORDER — OMEPRAZOLE 40 MG/1
40 CAPSULE, DELAYED RELEASE ORAL DAILY
Qty: 90 CAPSULE | Refills: 0 | Status: SHIPPED | OUTPATIENT
Start: 2025-01-06

## 2025-01-06 RX ORDER — IOPAMIDOL 755 MG/ML
500 INJECTION, SOLUTION INTRAVASCULAR ONCE
Status: COMPLETED | OUTPATIENT
Start: 2025-01-06 | End: 2025-01-06

## 2025-01-06 RX ADMIN — SODIUM CHLORIDE 60 ML: 9 INJECTION, SOLUTION INTRAVENOUS at 13:48

## 2025-01-06 RX ADMIN — IOPAMIDOL 71 ML: 755 INJECTION, SOLUTION INTRAVENOUS at 13:48

## 2025-01-06 ASSESSMENT — PAIN SCALES - GENERAL: PAINLEVEL_OUTOF10: EXTREME PAIN (8)

## 2025-01-06 NOTE — PATIENT INSTRUCTIONS
ASSESSMENT    - Possible appendicitis  - Possible diverticulitis  - Possible ovarian cyst  PLAN    - Perform a CT scan with stat status to quickly assess the cause of the right-sided abdominal pain.  - Prescribe omeprazole 40 mg to be taken each morning on an empty stomach, waiting 30 minutes before eating, to help manage reflux symptoms.  - Conduct stool testing to check for any infections or other issues that might be contributing to symptoms.  - Prescribe Simethicone to be taken twice daily for up to 10 days to alleviate bloating.  - Recommend taking a week off work to allow for rest and symptom management, with a return date set for January 13, 2025.      CT scan    Omeprazole 40 mg each morning    Simethicone twice a day for gas pain and bloating    Go to lab for stool studies

## 2025-01-06 NOTE — PROGRESS NOTES
"  Assessment & Plan     RLQ abdominal pain    Diverticulosis of large intestine without hemorrhage    Gastroesophageal reflux disease with esophagitis without hemorrhage  - omeprazole (PRILOSEC) 40 MG DR capsule; Take 1 capsule (40 mg) by mouth daily.    Constipation, unspecified constipation type  - Enteric Bacteria and Virus Panel by [facility] Stool; Future  - Enteric Bacteria and Virus Panel by [facility] Stool    Bloating  - simethicone (MYLICON) 125 MG chewable tablet; Take 1 tablet (125 mg) by mouth two times daily for 10 days.      No LOS data to display  20  min Time spent by me today doing chart review, history and exam, documentation and further activities per the note      BMI  Estimated body mass index is 26.63 kg/m  as calculated from the following:    Height as of 4/30/24: 1.575 m (5' 2\").    Weight as of this encounter: 66 kg (145 lb 9.6 oz).         See Patient Instructions  Patient Instructions   ASSESSMENT    - Possible appendicitis  - Possible diverticulitis  - Possible ovarian cyst  PLAN    - Perform a CT scan with stat status to quickly assess the cause of the right-sided abdominal pain.  - Prescribe omeprazole 40 mg to be taken each morning on an empty stomach, waiting 30 minutes before eating, to help manage reflux symptoms.  - Conduct stool testing to check for any infections or other issues that might be contributing to symptoms.  - Prescribe Simethicone to be taken twice daily for up to 10 days to alleviate bloating.  - Recommend taking a week off work to allow for rest and symptom management, with a return date set for January 13, 2025.      CT scan    Omeprazole 40 mg each morning    Simethicone twice a day for gas pain and bloating    Go to lab for stool studies    Mitchell Guo is a 64 year old, presenting for the following health issues:  Abdominal Pain        1/6/2025    12:40 PM   Additional Questions   Roomed by Laurie AGUILERA     History of Present Illness       Reason for visit:  Severe right " abdominal pain  Symptom onset:  3-7 days ago   She is taking medications regularly.    Started new years day and galindo getting worse    SUBJECTIVE    A 64-year-old female reports experiencing right-sided abdominal pain since September of the previous year. She recalls a previous colonoscopy that revealed diverticulosis. The pain intensified on New Year's Day, January 1, 2025, and has been severe since, making it difficult for her to walk. She has been taking a significant amount of Tylenol and consuming milk and cottage cheese in an attempt to soothe the pain. Despite these efforts, the pain returned last night and remains intense, rated as an eight on a scale of one to ten.    She describes the pain as throbbing and localized to a specific spot, making it impossible to lie on her right side. Lying on her back is only tolerable if she elevates her knees. She has tried using a heating pad for comfort, but it was ineffective last night. She avoids taking ibuprofen due to a sensitive stomach.    She mentions a past surgery in 2007 and is vigilant about her bowel movements, noting a recent one this morning, though it was minimal due to her reduced food intake. She denies noticing any blood in her stool. She experiences dizziness and lightheadedness, particularly when standing up too quickly, which she attributes to not eating much. She feels feverish but rarely registers an actual fever. She denies any cold-like or flu-like symptoms.    Her appetite is diminished, and she is trying to drink water and some milk. She has not taken any medication today, preferring to assess her pain without Tylenol. She recalls a past renal cyst and liver cyst but does not believe they are related to her current symptoms. She has experienced pelvic pain and has a history of ovarian cysts.    She reports worsening reflux over the past few months, with bowel movements having a burning odor. She has been using omeprazole, which  provides some relief, and is open to increasing the dosage. She experiences severe bloating and is under stress, which, along with the pain, affects her ability to eat. She is concerned about missing work due to her symptoms and is considering taking a week off to recover.  OBJECTIVE    Physical exam:    - Tenderness on right side of abdomen upon palpation    Imaging results:     - CT scan in October 2003 showed liver cysts, renal cysts, and diverticulosis    - Ultrasound was normal; gallbladder appeared normal                          Objective    Pulse 67   Resp 20   Wt 66 kg (145 lb 9.6 oz)   LMP 10/04/2012   SpO2 97%   BMI 26.63 kg/m    Body mass index is 26.63 kg/m .  Physical Exam   GENERAL: alert and no distress  EYES: Eyes grossly normal to inspection, PERRL and conjunctivae and sclerae normal  ABDOMEN: soft, tender to palpation in the right lower quadrant, no hepatosplenomegaly, no masses and bowel sounds normal  MS: no gross musculoskeletal defects noted, no edema  SKIN: no suspicious lesions or rashes  NEURO: Normal strength and tone, mentation intact and speech normal  PSYCH: mentation appears normal, affect normal/bright    Admission on 10/26/2023, Discharged on 10/26/2023   Component Date Value Ref Range Status    Case Report 10/26/2023    Final                    Value:Surgical Pathology Report                         Case: [id number]                                  Authorizing Provider:  Huber Mendoza,   Collected:           10/26/2023 09:57 AM                                 DO                                                                           Ordering Location:     [facility]          Received:            10/26/2023 10:43 AM                                 [facility]                                                          Pathologist:           Maged Mohamud MD PhD                                                      Specimens:   A) - Small Intestine,  Duodenum                                                                      B) - Esophagus, Distal                                                                     Final Diagnosis 10/26/2023    Final                    Value:A(1).  Duodenum, biopsy:                          -Small intestinal mucosa with active chronic peptic duodenitis.                          -Negative for H. Pylori organisms on immunohistochemical stains.                          -Blunted villous architecture identified and no prominence in                           intraepithelial lymphocytes seen.                          -Negative for luminal organisms.                          -Negative for dysplasia or malignancy.                                                                              B(2).  Esophagus, distal, biopsy:                          -Acute esophagitis with prominent reactive epithelial changes.                          -Special stains for fungal organisms (PAS) is negative.                          -Negative for intestinal metaplasia.                          -Negative for eosinophilic esophagitis.                          -Negative for dysplasia or malignancy.                                                        Clinical Information 10/26/2023    Final                    Value:Procedure:                          Esophagogastroduodenoscopy with biopsy                          Colonoscopy                          Pre-op Diagnosis: Abdominal pain, generalized [R10.84]                          RUQ abdominal pain [R10.11]                          Post-op Diagnosis: R10.84 - Abdominal pain, generalized [ICD-10-CM]                          R10.11 - RUQ abdominal pain [ICD-10-CM]    Gross Description 10/26/2023    Final                    Value:A(1). Small Intestine, Duodenum, :                          The specimen is received in formalin, labeled with the patient's name,                           medical record number and  "other identifying information designated                           \"duodenal biopsy\". It consists of 2 tan soft tissue fragments, 0.2 and 0.3                           cm.  Entirely submitted in 1 cassette.                                                    B(2). Esophagus, Distal, :                          The specimen is received in formalin, labeled with the patient's name,                           medical record number and other identifying information designated \"distal                           esophagus biopsy\". It consists of 3 pale-tan soft tissue fragments, each                           0.3 cm.  Entirely submitted in 1 cassette.                          ([facility] [place] Biopsy Tech)    Microscopic Description 10/26/2023    Final                    Value:Microscopic examination was performed.    Special Stains 10/26/2023    Final                    Value:-Special stains for fungal organisms (PAS) is negative.                          -Negative for H. Pylori organisms on immunohistochemical stains.                          All controls stain appropriately.                              Performing Labs 10/26/2023    Final                    Value:The technical component of this testing was completed at [facility]    Upper GI Endoscopy 10/26/2023    Final                    Value:[address] [address]  _______________________________________________________________________________  Patient Name: Sari Pollock          Procedure Date: 10/26/2023 9:46 AM  MRN: [id number]                       Account Number: [id number]  YOB: 1960             Admit Type: Outpatient  Age: 62                               Room: [place] [address]  Gender: Female                        Note Status: Finalized  Attending MD: DULCE MACKAY MD,  Total Sedation Time: "   _______________________________________________________________________________     Procedure:             Upper GI endoscopy  Indications:           Abdominal pain  Providers:             DULCE MACKAY MD  Referring MD:          JERICHO GONZALES MD  Medicines:             Monitored Anesthesia Care  Complications:         No immediate complications.  _______________________________________________________________________________  Proced                          ure:             Pre-Anesthesia Assessment:                         - Prior to the procedure, a History and Physical was                          performed, and patient medications, allergies and                          sensitivities were reviewed. The patient's tolerance                          of previous anesthesia was reviewed.                         - The risks and benefits of the procedure and the                          sedation options and risks were discussed with the                          patient. All questions were answered and informed                          consent was obtained.                         - After reviewing the risks and benefits, the patient                          was deemed in satisfactory condition to undergo the                          procedure.                         After obtaining informed consent, the endoscope was                          passed under direct vision. Throughout the procedure,                          the patient's blood p                          ressure, pulse, and oxygen                          saturations were monitored continuously. The Endoscope                          was introduced through the mouth, and advanced to the                          third part of duodenum. The upper GI endoscopy was                          accomplished without difficulty. The patient tolerated                          the procedure well.                                                                                    Findings:       Mild erythema was found in the distal esophagus. Biopsies were taken        with a cold forceps for histology.       The entire examined stomach was normal.       Mildly erythematous mucosa without active bleeding and with no stigmata        of bleeding was found in the second portion of the duodenum. Biopsies        were taken with a cold forceps for histology.       The exam was otherwise without abnormality.                                                                                   Impression:                                      - Erythema in the distal esophagus. Biopsied.                         - Normal stomach.                         - Erythematous duodenopathy. Biopsied.                         - The examination was otherwise normal.  Recommendation:        - Discharge patient to home.                         - Resume previous diet.                         - Continue present medications.                         - Await pathology results.                                                                                   Procedure Code(s):       --- Professional ---       70561, Esophagogastroduodenoscopy, flexible, transoral; with biopsy,        single or multiple    CPT copyright 2022 American Medical Association. All rights reserved.    The codes documented in this report are preliminary and upon  review may   be revised to meet current compliance requirements.    __________________________  DULCE MACKAY MD  10/26/2023 10:01:47 AM  Number of Addenda: 0    Note Initiated On: 10/26/202                          3 9:46 AM      COLONOSCOPY 10/26/2023    Final                    Value:[address] [address]  _______________________________________________________________________________  Patient Name: Sari Pollock          Procedure Date: 10/26/2023 10:00 AM  MRN: [id number]                        Account Number: [id number]  YOB: 1960             Admit Type: Outpatient  Age: 62                               Room: [place] [address]  Gender: Female                        Note Status: Finalized  Attending MD: DULCE MACKAY MD,  Total Sedation Time:   _______________________________________________________________________________     Procedure:             Colonoscopy  Indications:           Abdominal pain in the right lower quadrant  Providers:             DULCE MACKAY MD  Referring MD:          JERICHO GONZALES MD  Medicines:             Monitored Anesthesia Care  Complications:         No immediate complications.  _________________________________________________________________                          ______________  Procedure:             Pre-Anesthesia Assessment:                         - Prior to the procedure, a History and Physical was                          performed, and patient medications, allergies and                          sensitivities were reviewed. The patient's tolerance                          of previous anesthesia was reviewed.                         - The risks and benefits of the procedure and the                          sedation options and risks were discussed with the                          patient. All questions were answered and informed                          consent was obtained.                         - After reviewing the risks and benefits, the patient                          was deemed in satisfactory condition to undergo the                          procedure.                         After obtaining informed consent, the colonoscope was                          passed under direct vision. Throughout the procedure,                                                    the patient's blood pressure, pulse, and oxygen                          saturations were monitored continuously. The                          Colonoscope was  introduced through the anus and                          advanced to the cecum, identified by appendiceal                          orifice and ileocecal valve. The colonoscopy was                          performed without difficulty. The patient tolerated                          the procedure well. The quality of the bowel                          preparation was good.                                                                                   Findings:       The perianal and digital rectal examinations were normal.       The entire examined colon appeared normal.                                                                                   Impression:            - The entire examined colon is normal.                         - No specimens collected.  Recommendation:        - Discharge patient to home.                                                   - High fiber diet and low fat diet.                         - Continue present medications.                         - Repeat colonoscopy in 10 years for screening                          purposes.                                                                                   Procedure Code(s):       --- Professional ---       16144, Colonoscopy, flexible; diagnostic, including collection of        specimen(s) by brushing or washing, when performed (separate procedure)    CPT copyright 2022 American Medical Association. All rights reserved.    The codes documented in this report are preliminary and upon  review may   be revised to meet current compliance requirements.    __________________________  DULCE MACKAY MD  10/26/2023 10:19:16 AM  Number of Addenda: 0    Note Initiated On: 10/26/2023 10:00 AM       No results found for any visits on 01/06/25.  No results found.        Signed Electronically by: Shey Shipman PA-C

## 2025-01-06 NOTE — RESULT ENCOUNTER NOTE
Hello -    Here are my comments about the recent results. No acute abnormality in the abdomen or pelvis.    Please let us know if you have any questions or concerns.    Regards,  Shey Shipman PA-C

## 2025-01-06 NOTE — LETTER
2025    Sari Pollock   1960        To Whom it May Concern;    Please excuse Sari Pollock from work/school for a healthcare visit on 2025. Please excuse her for 1 week. She may return on 25.     Sincerely,        Shey Shipman PA-C

## 2025-01-07 LAB
ADV 40+41 DNA STL QL NAA+NON-PROBE: NEGATIVE
ASTRO TYP 1-8 RNA STL QL NAA+NON-PROBE: NEGATIVE
C CAYETANENSIS DNA STL QL NAA+NON-PROBE: NEGATIVE
CAMPYLOBACTER DNA SPEC NAA+PROBE: NEGATIVE
CRYPTOSP DNA STL QL NAA+NON-PROBE: NEGATIVE
E COLI O157 DNA STL QL NAA+NON-PROBE: NORMAL
E HISTOLYT DNA STL QL NAA+NON-PROBE: NEGATIVE
EAEC ASTA GENE ISLT QL NAA+PROBE: NEGATIVE
EC STX1+STX2 GENES STL QL NAA+NON-PROBE: NEGATIVE
EPEC EAE GENE STL QL NAA+NON-PROBE: NEGATIVE
ETEC LTA+ST1A+ST1B TOX ST NAA+NON-PROBE: NEGATIVE
G LAMBLIA DNA STL QL NAA+NON-PROBE: NEGATIVE
NOROVIRUS GI+II RNA STL QL NAA+NON-PROBE: NEGATIVE
P SHIGELLOIDES DNA STL QL NAA+NON-PROBE: NEGATIVE
RVA RNA STL QL NAA+NON-PROBE: NEGATIVE
SALMONELLA SP RPOD STL QL NAA+PROBE: NEGATIVE
SAPO I+II+IV+V RNA STL QL NAA+NON-PROBE: NEGATIVE
SHIGELLA SP+EIEC IPAH ST NAA+NON-PROBE: NEGATIVE
V CHOLERAE DNA SPEC QL NAA+PROBE: NEGATIVE
VIBRIO DNA SPEC NAA+PROBE: NEGATIVE
Y ENTEROCOL DNA STL QL NAA+PROBE: NEGATIVE

## 2025-02-25 ENCOUNTER — TELEPHONE (OUTPATIENT)
Dept: FAMILY MEDICINE | Facility: CLINIC | Age: 65
End: 2025-02-25
Payer: COMMERCIAL

## 2025-02-25 NOTE — TELEPHONE ENCOUNTER
Patient Quality Outreach    Patient is due for the following:   Breast Cancer Screening - Mammogram  Physical Annual Wellness Visit    Action(s) Taken:   Schedule a Annual Wellness Visit    Type of outreach:    Sent [employer] message.    Questions for provider review:    None           Savanna Ch MA

## 2025-04-07 ENCOUNTER — OFFICE VISIT (OUTPATIENT)
Dept: FAMILY MEDICINE | Facility: CLINIC | Age: 65
End: 2025-04-07
Payer: COMMERCIAL

## 2025-04-07 VITALS
HEIGHT: 62 IN | RESPIRATION RATE: 16 BRPM | OXYGEN SATURATION: 99 % | TEMPERATURE: 97.5 F | BODY MASS INDEX: 27.6 KG/M2 | WEIGHT: 150 LBS | HEART RATE: 66 BPM | SYSTOLIC BLOOD PRESSURE: 142 MMHG | DIASTOLIC BLOOD PRESSURE: 86 MMHG

## 2025-04-07 DIAGNOSIS — M62.838 MUSCLE SPASM: ICD-10-CM

## 2025-04-07 DIAGNOSIS — M25.512 ACUTE PAIN OF LEFT SHOULDER: Primary | ICD-10-CM

## 2025-04-07 PROCEDURE — 1125F AMNT PAIN NOTED PAIN PRSNT: CPT

## 2025-04-07 PROCEDURE — 99213 OFFICE O/P EST LOW 20 MIN: CPT

## 2025-04-07 PROCEDURE — 3077F SYST BP >= 140 MM HG: CPT

## 2025-04-07 PROCEDURE — 3079F DIAST BP 80-89 MM HG: CPT

## 2025-04-07 RX ORDER — CYCLOBENZAPRINE HCL 5 MG
5 TABLET ORAL
Qty: 30 TABLET | Refills: 0 | Status: SHIPPED | OUTPATIENT
Start: 2025-04-07

## 2025-04-07 ASSESSMENT — PAIN SCALES - GENERAL: PAINLEVEL_OUTOF10: SEVERE PAIN (7)

## 2025-04-07 NOTE — PATIENT INSTRUCTIONS
ASSESSMENT & PLAN    Shoulder Pain and Muscle Spasm  - Likely muscle spasm with possible micro tear or radiculopathy, exacerbated by activities such as sitting, bending, and twisting. Pain radiates slightly to the right but is primarily localized to the left shoulder blade area.  - Prescribe Flexeril to be taken at bedtime. Recommend Tylenol up to 4000 mg per day, divided into doses. Advise use of heat therapy and gentle stretching 4-6 times a day. Suggest over-the-counter lidocaine patch for 12-hour use and diclofenac gel (Voltaren) applied 4 times a day.  - Risks and side effects: Flexeril may cause drowsiness; advised not to drive when taking it.    Arthritis Pain  - Ongoing arthritis pain, particularly in the hands, worsening with age.  - Recommend using diclofenac gel for arthritis pain in the hands, applied 4 times a day. Suggest switching to Tylenol Arthritis for pain management.        Tylenol 1000 mg up to three times a day    Flexeril at bedtime as needed    Follow up with physical therapy     Lidocaine patches for 12 hours on, 12 hours off     Tylenol as needed     Diclofenac gel 4 times a day scheduled (voltaren gel)     Heat pack for 10 minutes followed by exercises 4-5 times a day     Follow up with any new, worsening, or persistent symptoms     Go to the ER in the case of an emergency

## 2025-04-07 NOTE — PROGRESS NOTES
"  Assessment & Plan     Acute pain of left shoulder  - cyclobenzaprine (FLEXERIL) 5 MG tablet; Take 1 tablet (5 mg) by mouth nightly as needed for muscle spasms.    Muscle spasm  - cyclobenzaprine (FLEXERIL) 5 MG tablet; Take 1 tablet (5 mg) by mouth nightly as needed for muscle spasms.          BMI  Estimated body mass index is 27.44 kg/m  as calculated from the following:    Height as of this encounter: 1.575 m (5' 2\").    Weight as of this encounter: 68 kg (150 lb).         See Patient Instructions  Patient Instructions   ASSESSMENT & PLAN    Shoulder Pain and Muscle Spasm  - Likely muscle spasm with possible micro tear or radiculopathy, exacerbated by activities such as sitting, bending, and twisting. Pain radiates slightly to the right but is primarily localized to the left shoulder blade area.  - Prescribe Flexeril to be taken at bedtime. Recommend Tylenol up to 4000 mg per day, divided into doses. Advise use of heat therapy and gentle stretching 4-6 times a day. Suggest over-the-counter lidocaine patch for 12-hour use and diclofenac gel (Voltaren) applied 4 times a day.  - Risks and side effects: Flexeril may cause drowsiness; advised not to drive when taking it.    Arthritis Pain  - Ongoing arthritis pain, particularly in the hands, worsening with age.  - Recommend using diclofenac gel for arthritis pain in the hands, applied 4 times a day. Suggest switching to Tylenol Arthritis for pain management.        Tylenol 1000 mg up to three times a day    Flexeril at bedtime as needed    Follow up with physical therapy     Lidocaine patches for 12 hours on, 12 hours off     Tylenol as needed     Diclofenac gel 4 times a day scheduled (voltaren gel)     Heat pack for 10 minutes followed by exercises 4-5 times a day     Follow up with any new, worsening, or persistent symptoms     Go to the ER in the case of an emergency                  Subjective   Sari is a 64 year old, presenting for the following health " issues:  Shoulder Pain      4/7/2025     2:15 PM   Additional Questions   Roomed by Alejandra         4/7/2025     2:15 PM   Patient Reported Additional Medications   Patient reports taking the following new medications none     Shoulder Pain    History of Present Illness       Back Pain:  She presents for follow up of back pain. Patient's back pain is a new problem.    Original cause of back pain: lifting  First noticed back pain: in the last week  Patient feels back pain: constantlyLocation of back pain:  Left middle of back  Description of back pain: gnawing and stabbing  Back pain spreads: nowhere    Since patient first noticed back pain, pain is: unchanged  Does back pain interfere with her job:  Yes  On a scale of 1-10 (10 being the worst), patient describes pain as:  7  What makes back pain worse: bending, certain positions, sitting and twisting   Acupuncture: not tried  Acetaminophen: helpful  Activity or exercise: not helpful  Chiropractor:  Not tried  Cold: not helpful  Heat: helpful  Massage: not tried  Muscle relaxants: not tried  NSAIDS: not tried  Opioids: not tried  Physical Therapy: not tried  Rest: helpful  Steroid Injection: not tried  Stretching: not helpful  Surgery: not tried  TENS unit: not tried  Topical pain relievers: not helpful  Other healthcare providers patient is seeing for back pain: None   She is taking medications regularly.      SUBJECTIVE    The patient is a 64-year-old female who reports experiencing pain in the shoulder blade area, which she describes as a pinched nerve or muscle issue. The pain began on Tuesday night and has persisted since then. She initially expected the pain to subside on its own but found that it did not improve. The pain is primarily located in the left shoulder blade and radiates slightly to the right when it becomes severe. It is exacerbated by activities such as sitting, bending, twisting, and outreaching on the affected side. However, she finds relief when  "lying down with a heating pad on her side, which allows her to be comfortable in bed.    The patient recalls that the pain may have started after she lifted a trash bin into her truck on Tuesday afternoon, an activity she has done many times before without issue. She did not feel any pain at the time but noticed it later that night. She has a history of using Flexeril for muscle relaxation. She also mentions having a sensitive stomach and intestines, which affects her medication choices. Currently, she is taking Tylenol for pain relief, consuming about 3,000 to 4,000 mg per day.    The patient works in housekeeping, and the pain is affecting her ability to perform daily tasks, particularly those that require reaching and bending. She expresses concern about the possibility of a pinched nerve or a muscle tear. Additionally, she experiences arthritis pain, especially in her hands, which she notes is becoming more bothersome with age.  OBJECTIVE    Physical exam:      - Muscle spasm noted in the shoulder area                    Objective    BP (!) 142/86 (BP Location: Right arm, Patient Position: Sitting, Cuff Size: Adult Large)   Pulse 66   Temp 97.5  F (36.4  C) (Temporal)   Resp 16   Ht 1.575 m (5' 2\")   Wt 68 kg (150 lb)   LMP 10/04/2012   SpO2 99%   BMI 27.44 kg/m    Body mass index is 27.44 kg/m .  Physical Exam   GENERAL: alert and no distress  EYES: Eyes grossly normal to inspection, PERRL and conjunctivae and sclerae normal  MS: no gross musculoskeletal defects noted, no edema.  Medial to the left shoulder blade there is an area of tenderness and palpable muscle spasm  SKIN: no suspicious lesions or rashes  NEURO: Normal strength and tone, mentation intact and speech normal  PSYCH: mentation appears normal, affect normal/bright    Office Visit on 01/06/2025   Component Date Value Ref Range Status    Campylobacter species 01/06/2025 Negative  Negative Final    Salmonella species 01/06/2025 Negative  " Negative Final    Vibrio species 01/06/2025 Negative  Negative Final    Vibrio cholerae 01/06/2025 Negative  Negative Final    Yersinia enterocolitica 01/06/2025 Negative  Negative Final    Enteropathogenic E. coli (EPEC) 01/06/2025 Negative  Negative, NA Final    Shiga-like toxin-producing E. coli* 01/06/2025 Negative  Negative Final    Shigella/Enteroinvasive E. coli (E* 01/06/2025 Negative  Negative Final    Cryptosporidium species 01/06/2025 Negative  Negative Final    Giardia lamblia 01/06/2025 Negative  Negative Final    Norovirus Gl/Gll 01/06/2025 Negative  Negative Final    Rotavirus A 01/06/2025 Negative  Negative Final    Plesiomonas shigelloides 01/06/2025 Negative  Negative Final    Enteroaggregative E. coli (EAEC) 01/06/2025 Negative  Negative Final    Enterotoxigenic E. coli (ETEC) 01/06/2025 Negative  Negative Final    E. coli O157 01/06/2025 NA  Negative, NA Final    Cyclospora cayetanensis 01/06/2025 Negative  Negative Final    Entamoeba histolytica 01/06/2025 Negative  Negative Final    Adenovirus F40/41 01/06/2025 Negative  Negative Final    Astrovirus 01/06/2025 Negative  Negative Final    Sapovirus 01/06/2025 Negative  Negative Final     No results found for any visits on 04/07/25.  No results found.        Signed Electronically by: Shey Shipman PA-C

## (undated) DEVICE — LUBRICATING JELLY 4.25OZ

## (undated) DEVICE — KIT ENDO TURNOVER/PROCEDURE CARRY-ON 101822

## (undated) DEVICE — SOL WATER IRRIG 1000ML BOTTLE 07139-09

## (undated) DEVICE — GLOVE EXAM NITRILE LG

## (undated) DEVICE — TUBING SUCTION 12"X1/4" N612

## (undated) DEVICE — ENDO FORCEP ENDOJAW BIOPSY 2.8MMX230CM FB-220U